# Patient Record
Sex: MALE | Race: WHITE | Employment: UNEMPLOYED | URBAN - METROPOLITAN AREA
[De-identification: names, ages, dates, MRNs, and addresses within clinical notes are randomized per-mention and may not be internally consistent; named-entity substitution may affect disease eponyms.]

---

## 2017-02-08 ENCOUNTER — HOSPITAL ENCOUNTER (OUTPATIENT)
Dept: GENERAL RADIOLOGY | Age: 72
Discharge: HOME | End: 2017-02-08
Attending: FAMILY MEDICINE | Admitting: FAMILY MEDICINE

## 2017-02-08 ENCOUNTER — OFFICE VISIT (OUTPATIENT)
Dept: FAMILY MEDICINE CLINIC | Age: 72
End: 2017-02-08

## 2017-02-08 VITALS
BODY MASS INDEX: 41.44 KG/M2 | WEIGHT: 249 LBS | HEART RATE: 60 BPM | TEMPERATURE: 96.9 F | DIASTOLIC BLOOD PRESSURE: 60 MMHG | SYSTOLIC BLOOD PRESSURE: 116 MMHG | OXYGEN SATURATION: 96 %

## 2017-02-08 DIAGNOSIS — M54.50 MIDLINE LOW BACK PAIN WITHOUT SCIATICA, UNSPECIFIED CHRONICITY: ICD-10-CM

## 2017-02-08 DIAGNOSIS — E03.9 ACQUIRED HYPOTHYROIDISM: ICD-10-CM

## 2017-02-08 DIAGNOSIS — Z91.09 ENVIRONMENTAL ALLERGIES: ICD-10-CM

## 2017-02-08 DIAGNOSIS — Z79.899 PHARMACOLOGIC THERAPY: ICD-10-CM

## 2017-02-08 DIAGNOSIS — N39.46 MIXED INCONTINENCE: ICD-10-CM

## 2017-02-08 DIAGNOSIS — F41.9 ANXIETY: ICD-10-CM

## 2017-02-08 DIAGNOSIS — Z13.9 SCREENING: ICD-10-CM

## 2017-02-08 DIAGNOSIS — I10 ESSENTIAL HYPERTENSION: Primary | ICD-10-CM

## 2017-02-08 LAB
ALBUMIN SERPL-MCNC: 4.3 GM/DL (ref 3.4–5)
ALP BLD-CCNC: 91 IU/L (ref 40–128)
ALT SERPL-CCNC: 15 U/L (ref 10–40)
AMPHETAMINE SCREEN, URINE: NORMAL
ANION GAP SERPL CALCULATED.3IONS-SCNC: 11 MMOL/L (ref 4–16)
AST SERPL-CCNC: 22 IU/L (ref 15–37)
BARBITURATE SCREEN, URINE: NORMAL
BASOPHILS ABSOLUTE: 0 K/CU MM
BASOPHILS RELATIVE PERCENT: 0.4 % (ref 0–1)
BENZODIAZEPINE SCREEN, URINE: NORMAL
BILIRUB SERPL-MCNC: 0.3 MG/DL (ref 0–1)
BUN BLDV-MCNC: 24 MG/DL (ref 6–23)
CALCIUM SERPL-MCNC: 9.7 MG/DL (ref 8.3–10.6)
CHLORIDE BLD-SCNC: 99 MMOL/L (ref 99–110)
CHOLESTEROL: 169 MG/DL
CO2: 30 MMOL/L (ref 21–32)
COCAINE METABOLITE SCREEN URINE: NORMAL
CREAT SERPL-MCNC: 1.2 MG/DL (ref 0.9–1.3)
DIFFERENTIAL TYPE: ABNORMAL
EOSINOPHILS ABSOLUTE: 0.1 K/CU MM
EOSINOPHILS RELATIVE PERCENT: 1.3 % (ref 0–3)
GFR AFRICAN AMERICAN: >60 ML/MIN/1.73M2
GFR NON-AFRICAN AMERICAN: 60 ML/MIN/1.73M2
GLUCOSE FASTING: 94 MG/DL (ref 70–99)
HCT VFR BLD CALC: 42.8 % (ref 42–52)
HDLC SERPL-MCNC: 29 MG/DL
HEMOGLOBIN: 13.1 GM/DL (ref 13.5–18)
IMMATURE NEUTROPHIL %: 0.1 % (ref 0–0.43)
LDL CHOLESTEROL CALCULATED: 112 MG/DL
LYMPHOCYTES ABSOLUTE: 1.9 K/CU MM
LYMPHOCYTES RELATIVE PERCENT: 27.5 % (ref 24–44)
MCH RBC QN AUTO: 30.2 PG (ref 27–31)
MCHC RBC AUTO-ENTMCNC: 30.6 % (ref 32–36)
MCV RBC AUTO: 98.6 FL (ref 78–100)
MDMA URINE: NORMAL
METHADONE SCREEN, URINE: NORMAL
METHAMPHETAMINE, URINE: NORMAL
MONOCYTES ABSOLUTE: 0.6 K/CU MM
MONOCYTES RELATIVE PERCENT: 8.9 % (ref 0–4)
NUCLEATED RBC %: 0 %
OPIATE SCREEN URINE: NORMAL
OXYCODONE SCREEN URINE: NORMAL
PDW BLD-RTO: 12.3 % (ref 11.7–14.9)
PHENCYCLIDINE SCREEN URINE: NORMAL
PLATELET # BLD: 202 K/CU MM (ref 140–440)
PMV BLD AUTO: 9.8 FL (ref 7.5–11.1)
POTASSIUM SERPL-SCNC: 5.3 MMOL/L (ref 3.5–5.1)
PROPOXYPHENE SCREEN, URINE: NORMAL
PROSTATE SPECIFIC ANTIGEN: 0.09 NG/ML (ref 0–4)
RBC # BLD: 4.34 M/CU MM (ref 4.6–6.2)
SEGMENTED NEUTROPHILS ABSOLUTE COUNT: 4.3 K/CU MM
SEGMENTED NEUTROPHILS RELATIVE PERCENT: 61.8 % (ref 36–66)
SODIUM BLD-SCNC: 140 MMOL/L (ref 135–145)
T3 FREE: 2.9 PG/ML (ref 2.3–4.2)
THC: NORMAL
TOTAL IMMATURE NEUTOROPHIL: 0.01 K/CU MM
TOTAL NUCLEATED RBC: 0 K/CU MM
TOTAL PROTEIN: 7.4 GM/DL (ref 6.4–8.2)
TRICYCLIC ANTIDEPRESSANTS, UR: NORMAL
TRIGL SERPL-MCNC: 140 MG/DL
TSH HIGH SENSITIVITY: 0.95 UIU/ML (ref 0.27–4.2)
WBC # BLD: 7 K/CU MM (ref 4–10.5)

## 2017-02-08 PROCEDURE — 80305 DRUG TEST PRSMV DIR OPT OBS: CPT | Performed by: FAMILY MEDICINE

## 2017-02-08 PROCEDURE — 99214 OFFICE O/P EST MOD 30 MIN: CPT | Performed by: FAMILY MEDICINE

## 2017-02-08 RX ORDER — FOLIC ACID 1 MG/1
1 TABLET ORAL DAILY
Qty: 30 TABLET | Refills: 5 | Status: SHIPPED | OUTPATIENT
Start: 2017-02-08

## 2017-02-08 RX ORDER — OLOPATADINE HYDROCHLORIDE 1 MG/ML
1 SOLUTION/ DROPS OPHTHALMIC 2 TIMES DAILY
Qty: 1 BOTTLE | Refills: 5 | Status: SHIPPED | OUTPATIENT
Start: 2017-02-08 | End: 2017-03-10

## 2017-02-08 RX ORDER — CLONAZEPAM 0.5 MG/1
0.5 TABLET ORAL DAILY
Qty: 30 TABLET | Refills: 5 | Status: SHIPPED | OUTPATIENT
Start: 2017-02-08 | End: 2021-05-03

## 2017-02-08 RX ORDER — LEVOCETIRIZINE DIHYDROCHLORIDE 5 MG/1
TABLET, FILM COATED ORAL
Qty: 30 TABLET | Refills: 5 | Status: SHIPPED | OUTPATIENT
Start: 2017-02-08

## 2017-02-08 RX ORDER — TROSPIUM CHLORIDE ER 60 MG/1
60 CAPSULE ORAL DAILY
Qty: 30 CAPSULE | Refills: 5 | Status: SHIPPED | OUTPATIENT
Start: 2017-02-08

## 2017-02-08 RX ORDER — GABAPENTIN 800 MG/1
TABLET ORAL
Qty: 120 TABLET | Refills: 5 | Status: SHIPPED | OUTPATIENT
Start: 2017-02-08 | End: 2018-07-31

## 2017-02-08 RX ORDER — LEVOTHYROXINE SODIUM 0.12 MG/1
250 TABLET ORAL DAILY
Qty: 60 TABLET | Refills: 5 | Status: SHIPPED | OUTPATIENT
Start: 2017-02-08

## 2017-02-08 RX ORDER — POTASSIUM CHLORIDE 750 MG/1
10 TABLET, EXTENDED RELEASE ORAL DAILY
COMMUNITY
End: 2018-07-31 | Stop reason: ALTCHOICE

## 2017-02-08 RX ORDER — RAMIPRIL 5 MG/1
5 CAPSULE ORAL DAILY
Qty: 30 CAPSULE | Refills: 5 | Status: ON HOLD | OUTPATIENT
Start: 2017-02-08 | End: 2021-05-06 | Stop reason: HOSPADM

## 2017-02-08 ASSESSMENT — ENCOUNTER SYMPTOMS
BACK PAIN: 1
RESPIRATORY NEGATIVE: 1
GASTROINTESTINAL NEGATIVE: 1

## 2017-02-09 LAB — T4 TOTAL: 11.13 UG/DL

## 2018-07-31 ENCOUNTER — OFFICE VISIT (OUTPATIENT)
Dept: CARDIOLOGY CLINIC | Age: 73
End: 2018-07-31

## 2018-07-31 VITALS
WEIGHT: 253 LBS | DIASTOLIC BLOOD PRESSURE: 72 MMHG | HEIGHT: 73 IN | SYSTOLIC BLOOD PRESSURE: 128 MMHG | HEART RATE: 70 BPM | BODY MASS INDEX: 33.53 KG/M2

## 2018-07-31 DIAGNOSIS — R06.02 SHORTNESS OF BREATH: Primary | ICD-10-CM

## 2018-07-31 PROCEDURE — 93000 ELECTROCARDIOGRAM COMPLETE: CPT | Performed by: INTERNAL MEDICINE

## 2018-07-31 PROCEDURE — 1101F PT FALLS ASSESS-DOCD LE1/YR: CPT | Performed by: INTERNAL MEDICINE

## 2018-07-31 PROCEDURE — 3017F COLORECTAL CA SCREEN DOC REV: CPT | Performed by: INTERNAL MEDICINE

## 2018-07-31 PROCEDURE — G8427 DOCREV CUR MEDS BY ELIG CLIN: HCPCS | Performed by: INTERNAL MEDICINE

## 2018-07-31 PROCEDURE — G8417 CALC BMI ABV UP PARAM F/U: HCPCS | Performed by: INTERNAL MEDICINE

## 2018-07-31 PROCEDURE — 99204 OFFICE O/P NEW MOD 45 MIN: CPT | Performed by: INTERNAL MEDICINE

## 2018-07-31 NOTE — PROGRESS NOTES
tablet Take 1 tablet by mouth daily 30 tablet 5    ramipril (ALTACE) 5 MG capsule Take 1 capsule by mouth daily 30 capsule 5    clonazePAM (KLONOPIN) 0.5 MG tablet Take 1 tablet by mouth daily 30 tablet 5    trospium (SANCTURA XR) 60 MG CP24 extended release capsule Take 1 capsule by mouth daily 30 capsule 5    levothyroxine (SYNTHROID) 125 MCG tablet Take 2 tablets by mouth daily 60 tablet 5    levocetirizine (XYZAL) 5 MG tablet TAKE ONE TABLET BY MOUTH DAILY 30 tablet 5    clotrimazole-betamethasone (LOTRISONE) cream Apply  topically every 72 hours. Apply every 3 days with dressing changes.  oxyCODONE-acetaminophen (PERCOCET) 5-325 MG per tablet Take 1 tablet by mouth every 4 hours as needed. No current facility-administered medications for this visit.           Review of Systems:    · Constitutional: No Fever or Weight Loss    · Eyes: No Decreased Vision  · ENT: No Headaches, Hearing Loss or Vertigo  · Cardiovascular: + chest pain, dyspnea on exertion, palpitations or loss of consciousness  · Respiratory: No cough or wheezing    · Gastrointestinal: No abdominal pain, appetite loss, blood in stools, constipation, diarrhea or heartburn  · Genitourinary: No dysuria, trouble voiding, or hematuria  · Musculoskeletal: No gait disturbance, weakness or joint complaints  · Integumentary: No rash or pruritis  · Neurological: No TIA or stroke symptoms  · Psychiatric: No anxiety or depression  · Endocrine: No malaise, fatigue or temperature intolerance  · Hematologic/Lymphatic: No bleeding problems, blood clots or swollen lymph nodes  · Allergic/Immunologic: No nasal congestion or hives  All systems negative except as marked.      ·    ·    ·      Physical Examination:    Vitals:    07/31/18 1646   BP: 128/72   Pulse: 70    rr 14  afebrile  Wt Readings from Last 3 Encounters:   07/31/18 253 lb (114.8 kg)   09/06/17 250 lb (113.4 kg)   02/08/17 249 lb (112.9 kg)     Body mass index is 33.38 kg/m².    General Appearance: No distress, conversant     Constitutional: Well developed, Well nourished, No acute distress, Non-toxic appearance.    HENT:  Normocephalic, Atraumatic, Bilateral external ears normal, Oropharynx moist, No oral exudates, Nose normal. Neck- Normal range of motion, No tenderness, Supple, No stridor,no apical-carotid delay, no carotid bruit  Eyes: PERRL, EOMI, Conjunctiva normal, No discharge.    Respiratory:  Normal breath sounds, No respiratory distress, No wheezing, No chest tenderness. ,no use of accessory muscles, diaphragm movement is normal  Cardiovascular: (PMI) apex non displaced,no lifts no thrills, no s3,no s4, Normal heart rate, Normal rhythm, No murmurs, No rubs, No gallops. Carotid arteries pulse and amplitude are normal no bruit, no abdominal bruit noted ( normal abdominal aorta ausculation), femoral arteries pulse and amplitude are normal no bruit, pedal pulses are normal.  GI: Bowel sounds normal, Soft, No tenderness, No masses, No pulsatile masses, no hepatosplenomegally, no bruits  : External genitalia appear normal, No masses or lesions. No discharge. No CVA tenderness.    Musculoskeletal: Intact distal pulses, No edema, No tenderness, No cyanosis, No clubbing. Good range of motion in all major joints. No tenderness to palpation or major deformities noted. Back- No tenderness. Integument: Warm, Dry, No erythema, No rash.    Skin: no rash, no ulcers  Lymphatic: No lymphadenopathy noted.    Neurologic: Alert & oriented x 3, Normal motor function, Normal sensory function, No focal deficits noted.    Psychiatric: Affect normal, Judgment normal, Mood normal.   Lab Review        Recent Labs       09/28/16 0010    WBC  12.3*    HGB  14.4    HCT  43.8    PLT  316            Recent Labs       09/28/16 0010    NA  136    K  3.9    CL  95*    CO2  23    BUN  10    CREATININE  0.8           Recent Labs       09/28/16 0010    AST  12*    ALT  10    BILITOT  0.4    ALKPHOS  124

## 2018-08-06 ENCOUNTER — PROCEDURE VISIT (OUTPATIENT)
Dept: CARDIOLOGY CLINIC | Age: 73
End: 2018-08-06

## 2018-08-06 DIAGNOSIS — R06.02 SHORTNESS OF BREATH: ICD-10-CM

## 2018-08-06 LAB
LV EF: 49 %
LVEF MODALITY: NORMAL

## 2018-08-06 PROCEDURE — A9500 TC99M SESTAMIBI: HCPCS | Performed by: INTERNAL MEDICINE

## 2018-08-06 PROCEDURE — 78452 HT MUSCLE IMAGE SPECT MULT: CPT | Performed by: INTERNAL MEDICINE

## 2018-08-06 PROCEDURE — 93015 CV STRESS TEST SUPVJ I&R: CPT | Performed by: INTERNAL MEDICINE

## 2018-08-08 ENCOUNTER — TELEPHONE (OUTPATIENT)
Dept: CARDIOLOGY CLINIC | Age: 73
End: 2018-08-08

## 2018-08-15 ENCOUNTER — OFFICE VISIT (OUTPATIENT)
Dept: CARDIOLOGY CLINIC | Age: 73
End: 2018-08-15

## 2018-08-15 ENCOUNTER — PROCEDURE VISIT (OUTPATIENT)
Dept: CARDIOLOGY CLINIC | Age: 73
End: 2018-08-15

## 2018-08-15 DIAGNOSIS — M79.89 LEG SWELLING: Primary | ICD-10-CM

## 2018-08-15 DIAGNOSIS — R06.02 SHORTNESS OF BREATH: ICD-10-CM

## 2018-08-15 DIAGNOSIS — R06.02 SHORTNESS OF BREATH: Primary | ICD-10-CM

## 2018-08-15 LAB
LV EF: 58 %
LVEF MODALITY: NORMAL

## 2018-08-15 PROCEDURE — 93970 EXTREMITY STUDY: CPT | Performed by: INTERNAL MEDICINE

## 2018-08-15 PROCEDURE — 93306 TTE W/DOPPLER COMPLETE: CPT | Performed by: INTERNAL MEDICINE

## 2018-08-17 ENCOUNTER — TELEPHONE (OUTPATIENT)
Dept: CARDIOLOGY CLINIC | Age: 73
End: 2018-08-17

## 2018-08-21 ENCOUNTER — OFFICE VISIT (OUTPATIENT)
Dept: CARDIOLOGY CLINIC | Age: 73
End: 2018-08-21

## 2018-08-21 VITALS
WEIGHT: 253.2 LBS | DIASTOLIC BLOOD PRESSURE: 74 MMHG | HEIGHT: 73 IN | HEART RATE: 60 BPM | SYSTOLIC BLOOD PRESSURE: 144 MMHG | BODY MASS INDEX: 33.56 KG/M2

## 2018-08-21 DIAGNOSIS — M79.89 LEG SWELLING: Primary | ICD-10-CM

## 2018-08-21 PROCEDURE — 1101F PT FALLS ASSESS-DOCD LE1/YR: CPT | Performed by: INTERNAL MEDICINE

## 2018-08-21 PROCEDURE — 99214 OFFICE O/P EST MOD 30 MIN: CPT | Performed by: INTERNAL MEDICINE

## 2018-08-21 PROCEDURE — 1123F ACP DISCUSS/DSCN MKR DOCD: CPT | Performed by: INTERNAL MEDICINE

## 2018-08-21 PROCEDURE — 3017F COLORECTAL CA SCREEN DOC REV: CPT | Performed by: INTERNAL MEDICINE

## 2018-08-21 PROCEDURE — G8417 CALC BMI ABV UP PARAM F/U: HCPCS | Performed by: INTERNAL MEDICINE

## 2018-08-21 PROCEDURE — 4040F PNEUMOC VAC/ADMIN/RCVD: CPT | Performed by: INTERNAL MEDICINE

## 2018-08-21 PROCEDURE — G8427 DOCREV CUR MEDS BY ELIG CLIN: HCPCS | Performed by: INTERNAL MEDICINE

## 2018-08-21 PROCEDURE — 1036F TOBACCO NON-USER: CPT | Performed by: INTERNAL MEDICINE

## 2018-08-21 NOTE — PROGRESS NOTES
Allergies: Baclofen and Morphine  Past Medical History:   Diagnosis Date    Arthritis     Blood circulation, collateral     Chronic venous hypertension with ulcer ([address]     Edema     Essential hypertension 4/19/2016    H/O cardiovascular stress test 08/06/2018    NM Normal study    History of nuclear stress test 08/06/2018    Normal LV function    Hx of Venous Doppler ultrasound 08/15/2018    No evidence of DVT or SVT No significant reflux noted in the veins of the right lower extremity.  Significant reflux noted in the Left CFV and mid FV,    Hyperlipidemia     Lumbago     Neuromuscular disorder (HCC)     Neuropathy     Orthostatic hypotension     Osteoarthritis     [facility]VD [facility]per[facility] vascular disease) ([facility]     Thyroid disease     Unstageable pressure ulcer of buttock ([address]      Past Surgical History:   Procedure Laterality Date    ADRENALECTOMY  1-6-1999    EYE SURGERY  9-    cataract removal right eye    EYE SURGERY  12-1-1997    cataract removal left eye    JOINT REPLACEMENT  1-    complete right hip     LAMINOTOMY  4-6-2005    microdiskectomy L3-L4    TOTAL HIP ARTHROPLASTY Left 2015     Family History   Problem Relation Age of Onset    Heart Disease Father     High Blood Pressure Father     Early Death Brother     Cancer Brother      Social History   Substance Use Topics    Smoking status: Never Smoker    Smokeless tobacco: Never Used    Alcohol use No      [unfilled]  Review of Systems:   · Constitutional: No Fever or Weight Loss   · Eyes: No Decreased Vision  · ENT: No Headaches, Hearing Loss or Vertigo  · Cardiovascular: No chest pain, dyspnea on exertion, palpitations or loss of consciousness  · Respiratory: No cough or wheezing    · Gastrointestinal: No abdominal pain, appetite loss, blood in stools, constipation, diarrhea or heartburn  · Genitourinary: No dysuria, trouble voiding, or hematuria  · Musculoskeletal:  No gait disturbance, weakness or joint complaints  · Integumentary: No rash or pruritis  · Neurological: No TIA or stroke symptoms  · Psychiatric: No anxiety or depression  · Endocrine: No malaise, fatigue or temperature intolerance  · Hematologic/Lymphatic: No bleeding problems, blood clots or swollen lymph nodes  · Allergic/Immunologic: No nasal congestion or hives  All systems negative except as marked. Objective:  BP (!) 144/74 (Site: Left Arm, Position: Sitting, Cuff Size: Large Adult)   Pulse 60   Ht 6' 1\" (1.854 m)   Wt 253 lb 3.2 oz (114.9 kg)   BMI 33.41 kg/m²   Wt Readings from Last 3 Encounters:   08/21/18 253 lb 3.2 oz (114.9 kg)   07/31/18 253 lb (114.8 kg)   09/06/17 250 lb (113.4 kg)     Body mass index is 33.41 kg/m². GENERAL - Alert, oriented, pleasant, in no apparent distress,normal grooming  HEENT  pupils are reactive to light and accomodation, cornea intact, conjunctive normal color, ears are normal in exam,throat exam in normal, teeth, gum and palate are normal, oral mucosa is normal without any notation of pallor or cyanosis  Neck - Supple. No jugular venous distention noted. No carotid bruits, no apical -carotid delay  Respiratory:  Normal breath sounds, No respiratory distress, No wheezing, No chest tenderness. ,no use of accessory muscles, diaphragm movement is normal  Cardiovascular: (PMI) apex non displaced,no lifts no thrills, no s3,no s4, Normal heart rate, Normal rhythm, No murmurs, No rubs, No gallops.  Carotid arteries pulse and amplitude are normal no bruit, no abdominal bruit noted ( normal abdominal aorta ausculation), femoral arteries pulse and amplitude are normal no bruit, pedal pulses are normal  Femoral pulses have normal amplitude, no bruits   Extremities - No cyanosis, clubbing, or significant edema, no varicose veins    Abdomen  No masses, tenderness, or organomegaly, no hepato-splenomegally, no bruits  Musculoskeletal  No significant edema, no kyphosis or scoliosis, no deformity in any extremity noted, muscle strength and tone are normal  Skin: no ulcer,no scar,no stasis dermatitis   Neurologic  alert oriented times 3,Cranial nerves II through XII are grossly intact. There were no gross focal neurologic abnormalities. All sensory and motor nerves examined and were normal  Psychiatric: normal mood and affect    No results found for: CKTOTAL, CKMB, CKMBINDEX, TROPONINI  BNP:  No results found for: BNP  PT/INR:  No results found for: PTINR  Lab Results   Component Value Date    LABA1C 5.3 09/04/2015     Lab Results   Component Value Date    CHOL 169 02/08/2017    TRIG 140 02/08/2017    HDL 29 (L) 02/08/2017    LDLCALC 112 (H) 02/08/2017    LDLDIRECT 117 (H) 10/17/2016     Lab Results   Component Value Date    ALT 15 02/08/2017    AST 22 02/08/2017     TSH:  No results found for: TSH    Impression:  Nabor Cordero is a 68 y. o.year old who  has a past medical history of Arthritis; Blood circulation, collateral; Chronic venous hypertension with ulcer ([address] Edema; Essential hypertension; H/O cardiovascular stress test; History of nuclear stress test; Hx of Venous Doppler ultrasound; Hyperlipidemia; Lumbago; Neuromuscular disorder ([address] Neuropathy; Orthostatic hypotension; Osteoarthritis; PVD (peripheral vascular disease) ([address] Thyroid disease; and Unstageable pressure ulcer of buttock ([address] and presents with     Plan:  1. Chest pain: normal patient stress test and echo  2. Leg swelling and stasis dermatitis; venous doppler showed left SFV venous insufficiency, will recommend to use compressions   3. HTN: stable, continue altace  4. Hypothyroidism: continue synthroid  5. Exhaustion; r/o hypothyroidism. 6. Dyslipidemia: stable  7. All labs, medications and tests reviewed, continue all other medications of all above medical condition listed as is.

## 2018-11-23 ENCOUNTER — ANESTHESIA EVENT (OUTPATIENT)
Dept: ENDOSCOPY | Age: 73
End: 2018-11-23
Payer: MEDICARE

## 2018-11-23 NOTE — ANESTHESIA PRE PROCEDURE
POC Tests: No results for input(s): POCGLU, POCNA, POCK, POCCL, POCBUN, POCHEMO, POCHCT in the last 72 hours. Coags:   Lab Results   Component Value Date    PROTIME 11.6 02/06/2012    INR 1.06 02/06/2012       HCG (If Applicable): No results found for: PREGTESTUR, PREGSERUM, HCG, HCGQUANT     ABGs: No results found for: PHART, PO2ART, HND9OCW, JPA0FRW, BEART, U3QOPWBA     Type & Screen (If Applicable):  No results found for: [facility]    Anesthesia Evaluation  Patient summary reviewed   history of anesthetic complications ( Known difficult airway - note from anesthesia at [facility]): difficult airway. Airway: Mallampati: II  TM distance: <3 FB   Neck ROM: limited  Comment: No ROM in c spine - bone spur and multiple spinal fusions  Mouth opening: > = 3 FB Dental:    (+) upper dentures and partials      Pulmonary: breath sounds clear to auscultation                             Cardiovascular:  Exercise tolerance: good (>4 METS),   (+) hypertension: moderate, hyperlipidemia      ECG reviewed  Rhythm: regular  Rate: normal  Echocardiogram reviewed         Beta Blocker:  Not on Beta Blocker      ROS comment: 2018 echo   Summary   Left ventricular systolic function is normal with an ejection fraction of   55-60%.  Grade I diastolic dysfunction.   Mild concentric left ventricular hypertrophy.   No significant valvular disease noted.   No evidence of pericardial effusion. Neuro/Psych:   (+) neuromuscular disease:,             GI/Hepatic/Renal:             Endo/Other:    (+) hypothyroidism::., .                  ROS comment: BPH  Chronic back pain - bone spur in c spine Abdominal:           Vascular:   + PVD, aortic or cerebral, . Anesthesia Plan      MAC and TIVA     ASA 3       Induction: intravenous. Anesthetic plan and risks discussed with patient. Plan discussed with CRNA.                 MIGUEL Sanchez - CRNA   11/23/2018

## 2018-11-26 RX ORDER — DULOXETIN HYDROCHLORIDE 30 MG/1
30 CAPSULE, DELAYED RELEASE ORAL DAILY
COMMUNITY
End: 2019-06-17

## 2018-11-26 RX ORDER — DONEPEZIL HYDROCHLORIDE 10 MG/1
10 TABLET, FILM COATED ORAL NIGHTLY
Status: ON HOLD | COMMUNITY
End: 2021-05-06 | Stop reason: HOSPADM

## 2018-11-26 RX ORDER — SUCRALFATE ORAL 1 G/10ML
1 SUSPENSION ORAL 2 TIMES DAILY
COMMUNITY
End: 2019-06-17

## 2018-11-26 RX ORDER — PANTOPRAZOLE SODIUM 40 MG/1
40 TABLET, DELAYED RELEASE ORAL DAILY
COMMUNITY
End: 2019-06-17

## 2018-11-27 ENCOUNTER — ANESTHESIA (OUTPATIENT)
Dept: ENDOSCOPY | Age: 73
End: 2018-11-27
Payer: MEDICARE

## 2018-11-27 ENCOUNTER — HOSPITAL ENCOUNTER (OUTPATIENT)
Age: 73
Setting detail: OUTPATIENT SURGERY
Discharge: HOME | End: 2018-11-27
Attending: INTERNAL MEDICINE | Admitting: INTERNAL MEDICINE
Payer: MEDICARE

## 2018-11-27 VITALS
SYSTOLIC BLOOD PRESSURE: 141 MMHG | OXYGEN SATURATION: 95 % | TEMPERATURE: 97.9 F | HEIGHT: 69 IN | DIASTOLIC BLOOD PRESSURE: 68 MMHG | WEIGHT: 249 LBS | RESPIRATION RATE: 16 BRPM | HEART RATE: 62 BPM | BODY MASS INDEX: 36.88 KG/M2

## 2018-11-27 VITALS — DIASTOLIC BLOOD PRESSURE: 67 MMHG | SYSTOLIC BLOOD PRESSURE: 119 MMHG | OXYGEN SATURATION: 100 %

## 2018-11-27 PROCEDURE — 7100000010 HC PHASE II RECOVERY - FIRST 15 MIN: Performed by: INTERNAL MEDICINE

## 2018-11-27 PROCEDURE — 88305 TISSUE EXAM BY PATHOLOGIST: CPT

## 2018-11-27 PROCEDURE — 2709999900 HC NON-CHARGEABLE SUPPLY: Performed by: INTERNAL MEDICINE

## 2018-11-27 PROCEDURE — 88342 IMHCHEM/IMCYTCHM 1ST ANTB: CPT

## 2018-11-27 PROCEDURE — 7100000011 HC PHASE II RECOVERY - ADDTL 15 MIN: Performed by: INTERNAL MEDICINE

## 2018-11-27 PROCEDURE — 3700000001 HC ADD 15 MINUTES (ANESTHESIA): Performed by: INTERNAL MEDICINE

## 2018-11-27 PROCEDURE — 2580000003 HC RX 258: Performed by: NURSE ANESTHETIST, CERTIFIED REGISTERED

## 2018-11-27 PROCEDURE — 3609010600 HC COLONOSCOPY POLYPECTOMY SNARE/COLD BIOPSY: Performed by: INTERNAL MEDICINE

## 2018-11-27 PROCEDURE — 2580000003 HC RX 258

## 2018-11-27 PROCEDURE — 3700000000 HC ANESTHESIA ATTENDED CARE: Performed by: INTERNAL MEDICINE

## 2018-11-27 PROCEDURE — 2500000003 HC RX 250 WO HCPCS: Performed by: NURSE ANESTHETIST, CERTIFIED REGISTERED

## 2018-11-27 PROCEDURE — 6360000002 HC RX W HCPCS: Performed by: NURSE ANESTHETIST, CERTIFIED REGISTERED

## 2018-11-27 PROCEDURE — 2500000003 HC RX 250 WO HCPCS: Performed by: INTERNAL MEDICINE

## 2018-11-27 PROCEDURE — 3609012400 HC EGD TRANSORAL BIOPSY SINGLE/MULTIPLE: Performed by: INTERNAL MEDICINE

## 2018-11-27 RX ORDER — SODIUM CHLORIDE, SODIUM LACTATE, POTASSIUM CHLORIDE, CALCIUM CHLORIDE 600; 310; 30; 20 MG/100ML; MG/100ML; MG/100ML; MG/100ML
INJECTION, SOLUTION INTRAVENOUS
Status: COMPLETED
Start: 2018-11-27 | End: 2018-11-27

## 2018-11-27 RX ORDER — LIDOCAINE HYDROCHLORIDE 20 MG/ML
INJECTION, SOLUTION INFILTRATION; PERINEURAL PRN
Status: DISCONTINUED | OUTPATIENT
Start: 2018-11-27 | End: 2018-11-27 | Stop reason: SDUPTHER

## 2018-11-27 RX ORDER — KETAMINE HYDROCHLORIDE 50 MG/ML
50 INJECTION, SOLUTION, CONCENTRATE INTRAMUSCULAR; INTRAVENOUS ONCE
Status: COMPLETED | OUTPATIENT
Start: 2018-11-27 | End: 2018-11-27

## 2018-11-27 RX ORDER — SODIUM CHLORIDE, SODIUM LACTATE, POTASSIUM CHLORIDE, CALCIUM CHLORIDE 600; 310; 30; 20 MG/100ML; MG/100ML; MG/100ML; MG/100ML
INJECTION, SOLUTION INTRAVENOUS CONTINUOUS PRN
Status: DISCONTINUED | OUTPATIENT
Start: 2018-11-27 | End: 2018-11-27 | Stop reason: SDUPTHER

## 2018-11-27 RX ORDER — PROPOFOL 10 MG/ML
INJECTION, EMULSION INTRAVENOUS PRN
Status: DISCONTINUED | OUTPATIENT
Start: 2018-11-27 | End: 2018-11-27 | Stop reason: SDUPTHER

## 2018-11-27 RX ORDER — SODIUM CHLORIDE, SODIUM LACTATE, POTASSIUM CHLORIDE, CALCIUM CHLORIDE 600; 310; 30; 20 MG/100ML; MG/100ML; MG/100ML; MG/100ML
INJECTION, SOLUTION INTRAVENOUS ONCE
Status: COMPLETED | OUTPATIENT
Start: 2018-11-27 | End: 2018-11-27

## 2018-11-27 RX ADMIN — PROPOFOL 20 MG: 10 INJECTION, EMULSION INTRAVENOUS at 11:03

## 2018-11-27 RX ADMIN — LIDOCAINE HYDROCHLORIDE 100 MG: 20 INJECTION, SOLUTION INFILTRATION; PERINEURAL at 10:38

## 2018-11-27 RX ADMIN — PROPOFOL 20 MG: 10 INJECTION, EMULSION INTRAVENOUS at 10:51

## 2018-11-27 RX ADMIN — PROPOFOL 20 MG: 10 INJECTION, EMULSION INTRAVENOUS at 11:36

## 2018-11-27 RX ADMIN — PROPOFOL 20 MG: 10 INJECTION, EMULSION INTRAVENOUS at 10:45

## 2018-11-27 RX ADMIN — PROPOFOL 20 MG: 10 INJECTION, EMULSION INTRAVENOUS at 10:56

## 2018-11-27 RX ADMIN — PROPOFOL 20 MG: 10 INJECTION, EMULSION INTRAVENOUS at 11:14

## 2018-11-27 RX ADMIN — Medication 15 MG: at 10:55

## 2018-11-27 RX ADMIN — PROPOFOL 20 MG: 10 INJECTION, EMULSION INTRAVENOUS at 10:39

## 2018-11-27 RX ADMIN — PROPOFOL 20 MG: 10 INJECTION, EMULSION INTRAVENOUS at 10:49

## 2018-11-27 RX ADMIN — PROPOFOL 20 MG: 10 INJECTION, EMULSION INTRAVENOUS at 10:41

## 2018-11-27 RX ADMIN — PROPOFOL 60 MG: 10 INJECTION, EMULSION INTRAVENOUS at 10:38

## 2018-11-27 RX ADMIN — SODIUM CHLORIDE, POTASSIUM CHLORIDE, SODIUM LACTATE AND CALCIUM CHLORIDE: 600; 310; 30; 20 INJECTION, SOLUTION INTRAVENOUS at 11:15

## 2018-11-27 RX ADMIN — PROPOFOL 20 MG: 10 INJECTION, EMULSION INTRAVENOUS at 10:43

## 2018-11-27 RX ADMIN — PROPOFOL 20 MG: 10 INJECTION, EMULSION INTRAVENOUS at 11:25

## 2018-11-27 RX ADMIN — Medication 10 MG: at 11:22

## 2018-11-27 RX ADMIN — PROPOFOL 20 MG: 10 INJECTION, EMULSION INTRAVENOUS at 11:33

## 2018-11-27 RX ADMIN — PROPOFOL 20 MG: 10 INJECTION, EMULSION INTRAVENOUS at 11:07

## 2018-11-27 RX ADMIN — PROPOFOL 20 MG: 10 INJECTION, EMULSION INTRAVENOUS at 11:30

## 2018-11-27 RX ADMIN — PROPOFOL 20 MG: 10 INJECTION, EMULSION INTRAVENOUS at 11:21

## 2018-11-27 RX ADMIN — PROPOFOL 20 MG: 10 INJECTION, EMULSION INTRAVENOUS at 11:28

## 2018-11-27 RX ADMIN — SODIUM CHLORIDE, POTASSIUM CHLORIDE, SODIUM LACTATE AND CALCIUM CHLORIDE: 600; 310; 30; 20 INJECTION, SOLUTION INTRAVENOUS at 10:15

## 2018-11-27 RX ADMIN — PROPOFOL 20 MG: 10 INJECTION, EMULSION INTRAVENOUS at 11:32

## 2018-11-27 RX ADMIN — PROPOFOL 20 MG: 10 INJECTION, EMULSION INTRAVENOUS at 10:47

## 2018-11-27 RX ADMIN — PROPOFOL 20 MG: 10 INJECTION, EMULSION INTRAVENOUS at 11:10

## 2018-11-27 RX ADMIN — SODIUM CHLORIDE, POTASSIUM CHLORIDE, SODIUM LACTATE AND CALCIUM CHLORIDE: 600; 310; 30; 20 INJECTION, SOLUTION INTRAVENOUS at 08:41

## 2018-11-27 RX ADMIN — PROPOFOL 20 MG: 10 INJECTION, EMULSION INTRAVENOUS at 11:18

## 2018-11-27 RX ADMIN — PROPOFOL 20 MG: 10 INJECTION, EMULSION INTRAVENOUS at 10:53

## 2018-11-27 RX ADMIN — Medication 10 MG: at 11:00

## 2018-11-27 RX ADMIN — PROPOFOL 20 MG: 10 INJECTION, EMULSION INTRAVENOUS at 10:59

## 2018-11-27 RX ADMIN — SODIUM CHLORIDE, SODIUM LACTATE, POTASSIUM CHLORIDE, CALCIUM CHLORIDE: 600; 310; 30; 20 INJECTION, SOLUTION INTRAVENOUS at 08:41

## 2018-11-27 ASSESSMENT — PAIN SCALES - GENERAL
PAINLEVEL_OUTOF10: 0

## 2018-11-27 ASSESSMENT — PAIN - FUNCTIONAL ASSESSMENT: PAIN_FUNCTIONAL_ASSESSMENT: 0-10

## 2018-11-27 NOTE — OP NOTE
Full note in [facility]    Indication:  Nausea, weight loss, GERD    Procedure:  EGD with biopsy    Findings:  Erythematous gastric mucosa, biopsied to evaluate for H. Pylori, portal hypertensive gastropathy  Otherwise normal exam    EBL: minimal      Complications: none      Plan:  F/u path  RTC 2 weeks

## 2018-11-27 NOTE — H&P
Pulse 70   Temp 97 °F (36.1 °C) (Temporal)   Resp 18   Ht 5' 9\" (1.753 m)   Wt 249 lb (112.9 kg)   SpO2 95%   BMI 36.77 kg/m²     General Appearance:    Alert, cooperative, no distress, appears stated age   HEENT:    Normocephalic, atraumatic, anicteric   Neck:   Supple, symmetrical, trachea midline, no adenopathy;      Lungs:     Clear to auscultation bilaterally, respirations unlabored        Heart:    Regular rate and rhythm, S1 and S2 normal, no murmur, rub   or gallop   Abdomen:     Soft, non-tender, bowel sounds active all four quadrants,     no masses, no organomegaly, no ascitis   Rectal:    Planned at time of C scope   Extremities:   Extremities normal, atraumatic, no cyanosis or edema   Pulses:   2+ and symmetric all extremities   Skin:   Skin color, texture, turgor normal, no rashes or lesions       Neurologic:   No focal deficits, moving all four extremities      ASA Grade:  ASA 3 - Patient with moderate systemic disease with functional limitations  Air Way:    Prior Anesthesia complication: NILL    ASSESSMENT AND PLAN:    1. Patient is a 68 y.o. male here for colonoscopy and EGD with deep sedation  2. Procedure options, risks and benefits reviewed with patient. Patient expresses understanding.     [employer]  11/27/2018  10:23 AM

## 2019-06-12 ENCOUNTER — TELEPHONE (OUTPATIENT)
Dept: CARDIOLOGY CLINIC | Age: 74
End: 2019-06-12

## 2019-06-12 NOTE — LETTER
[facility]  [address], [place], [address]  [facility], [address]  Phone: [phone]    Fax [phone]                  Dick Chilel MD, Halina Griffin MD, Raj Pereira MD, MD Brunilda Morgan, MD [place], APRN-CNP   Levonne Lung, APRN-CNP  Lendon Fears, APRN-CNP    Cardiac Risk Assessment                                                                                           6/20/2019       Surgery Date: 6/20/19  Surgery: Replacement of device for intrathecal drug infusion into L lower abd  Anesthesia Type: General  Fax Number: [phone]    To: Dr. Samara Victor  From : Dr. General Arellano    Patient Name: Lemuel Monaco     YOB: 1945    The patient was evaluated from a cardiac standpoint for his surgery or procedure and based on his history and test results, he is considered a medium risk candidate for any jossue-operative cardiac complications. Please continue patient's current medical regimen; do not discontinue beta blockers. Antiplatelet therapy can be held prior to the surgery or procedure at the discretion of the surgeon to be resumed as soon as possible if held. Please call with any further questions.     Respectfully,          Gary Esposito MD, MA/ramiros

## 2019-06-12 NOTE — TELEPHONE ENCOUNTER
Cardiac clearance request received from Dr. Claudy Fry for Replacement of defice for intrathecal drug infusion into L lower Abd scheduled  6/20/19. FAX # [phone]. Patient has not been seen since August 2018.   He was notified and made an appointment for 5/17/19 @ 4:20 with Dr. Fabiola Henley

## 2019-06-17 ENCOUNTER — OFFICE VISIT (OUTPATIENT)
Dept: CARDIOLOGY CLINIC | Age: 74
End: 2019-06-17
Payer: MEDICARE

## 2019-06-17 VITALS
DIASTOLIC BLOOD PRESSURE: 70 MMHG | HEART RATE: 60 BPM | WEIGHT: 246.2 LBS | HEIGHT: 72 IN | SYSTOLIC BLOOD PRESSURE: 110 MMHG | BODY MASS INDEX: 33.35 KG/M2

## 2019-06-17 DIAGNOSIS — Z01.810 PREOP CARDIOVASCULAR EXAM: Primary | ICD-10-CM

## 2019-06-17 PROCEDURE — 1036F TOBACCO NON-USER: CPT | Performed by: INTERNAL MEDICINE

## 2019-06-17 PROCEDURE — 99214 OFFICE O/P EST MOD 30 MIN: CPT | Performed by: INTERNAL MEDICINE

## 2019-06-17 PROCEDURE — 1123F ACP DISCUSS/DSCN MKR DOCD: CPT | Performed by: INTERNAL MEDICINE

## 2019-06-17 PROCEDURE — G8427 DOCREV CUR MEDS BY ELIG CLIN: HCPCS | Performed by: INTERNAL MEDICINE

## 2019-06-17 PROCEDURE — 3017F COLORECTAL CA SCREEN DOC REV: CPT | Performed by: INTERNAL MEDICINE

## 2019-06-17 PROCEDURE — 4040F PNEUMOC VAC/ADMIN/RCVD: CPT | Performed by: INTERNAL MEDICINE

## 2019-06-17 PROCEDURE — G8417 CALC BMI ABV UP PARAM F/U: HCPCS | Performed by: INTERNAL MEDICINE

## 2019-06-17 NOTE — PROGRESS NOTES
Syd Alvarez MD        OFFICE  FOLLOWUP NOTE    Chief complaints: patient is here for management of leg swelling, chest pain, HTN, dyslipidemia, hypothyroidism, PREOP    Reason for visit: preop for PAIN PUMP REPLACEMENT    Subjective: patient feels better, no chest pain, no shortness of breath, no dizziness, no palpitations    HPI Lashell Simmons is a 68 y. o.year old who  has a past medical history of Arthritis, Blood circulation, collateral, Chronic back pain, Chronic venous hypertension with ulcer ([address] Difficult intubation, Edema, Enlarged prostate, Essential hypertension, H/O cardiovascular stress test, History of nuclear stress test, Hx of Venous Doppler ultrasound, HX OTHER MEDICAL, Hyperlipidemia, Lumbago, Neuromuscular disorder ([address] Neuropathy, Orthostatic hypotension, Osteoarthritis, PVD (peripheral vascular disease) ([address] Short-term memory loss, Thyroid disease, and Unstageable pressure ulcer of buttock ([address] and presents for management of management of leg swelling, chest pain, HTN, dyslipidemia, hypothyroidism which are well controlled, he will need PAIN PUMP REPLACEMENT      Current Outpatient Medications   Medication Sig Dispense Refill    donepezil (ARICEPT) 10 MG tablet Take 10 mg by mouth nightly      oxaprozin (DAYPRO) 600 MG tablet Take 1 tablet by mouth daily 30 tablet 5    folic acid (FOLVITE) 1 MG tablet Take 1 tablet by mouth daily 30 tablet 5    ramipril (ALTACE) 5 MG capsule Take 1 capsule by mouth daily 30 capsule 5    clonazePAM (KLONOPIN) 0.5 MG tablet Take 1 tablet by mouth daily (Patient taking differently: Take 0.5 mg by mouth daily as needed. Celena aKufman ) 30 tablet 5    trospium (SANCTURA XR) 60 MG CP24 extended release capsule Take 1 capsule by mouth daily 30 capsule 5    levothyroxine (SYNTHROID) 125 MCG tablet Take 2 tablets by mouth daily 60 tablet 5    levocetirizine (XYZAL) 5 MG tablet TAKE ONE TABLET BY MOUTH DAILY 30 tablet 5    oxyCODONE-acetaminophen (PERCOCET) 5-325 MG per tablet Take 1 tablet by mouth every 4 hours as needed.  Compression Stockings MISC by Does not apply route 20 - 30 mmh Wear Daily Can Take Off At Night  Thigh High 1 each 1    clotrimazole-betamethasone (LOTRISONE) cream Apply  topically every 72 hours. Apply every 3 days with dressing changes. No current facility-administered medications for this visit. Allergies: Patient has no known allergies. Past Medical History:   Diagnosis Date    Arthritis     Blood circulation, collateral     Chronic back pain     Chronic venous hypertension with ulcer ([address]     Difficult intubation     Edema     Enlarged prostate     Essential hypertension 4/19/2016    H/O cardiovascular stress test 08/06/2018    NM Normal study    History of nuclear stress test 08/06/2018    Normal LV function    Hx of Venous Doppler ultrasound 08/15/2018    No evidence of DVT or SVT No significant reflux noted in the veins of the right lower extremity.  Significant reflux noted in the Left CFV and mid FV,    HX OTHER MEDICAL     \"have trouble swallowing sometimes and they have had trouble putting the tube down my throat in the past- have a letter will bring in copy for anesthesia    Hyperlipidemia     Lumbago     Neuromuscular disorder ([address]     Neuropathy     Orthostatic hypotension     Osteoarthritis     PVD (peripheral vascular disease) ([address]     Short-term memory loss     on medication    Thyroid disease     Unstageable pressure ulcer of buttock ([address]      Past Surgical History:   Procedure Laterality Date    ADRENALECTOMY  1-6-1999    BACK SURGERY      \"had back surgery total of 6 times over past 25 yrs- last one 2005- first one was [address]      \"had colonoscopy 40 yrs ago\"    COLONOSCOPY N/A 11/27/2018    COLONOSCOPY POLYPECTOMY SNARE/COLD BIOPSY performed by Benjie Soto MD at [facility].  9-    cataract removal right eye    EYE SURGERY  12-1-1997    cataract removal left eye    JOINT REPLACEMENT  1-    complete right hip     LAMINOTOMY  4-6-2005    microdiskectomy L3-L4    OTHER SURGICAL HISTORY      \"have infusion pump ( pain pump) put in 2008 and second one done 2015\"    TONSILLECTOMY  age 29    TOTAL HIP ARTHROPLASTY Left 2015    UPPER GASTROINTESTINAL ENDOSCOPY N/A 11/27/2018    EGD BIOPSY performed by Felton Champion MD at [facility] ENDOSCOPY     Family History   Problem Relation Age of Onset    Heart Disease Mother     High Blood Pressure Mother     Heart Disease Father     High Blood Pressure Father     Early Death Brother     Cancer Brother      Social History     Tobacco Use    Smoking status: Never Smoker    Smokeless tobacco: Never Used   Substance Use Topics    Alcohol use: No      [unfilled]  Review of Systems:   · Constitutional: No Fever or Weight Loss   · Eyes: No Decreased Vision  · ENT: No Headaches, Hearing Loss or Vertigo  · Cardiovascular: No chest pain, dyspnea on exertion, palpitations or loss of consciousness  · Respiratory: No cough or wheezing    · Gastrointestinal: No abdominal pain, appetite loss, blood in stools, constipation, diarrhea or heartburn  · Genitourinary: No dysuria, trouble voiding, or hematuria  · Musculoskeletal:  No gait disturbance, weakness or joint complaints  · Integumentary: No rash or pruritis  · Neurological: No TIA or stroke symptoms  · Psychiatric: No anxiety or depression  · Endocrine: No malaise, fatigue or temperature intolerance  · Hematologic/Lymphatic: No bleeding problems, blood clots or swollen lymph nodes  · Allergic/Immunologic: No nasal congestion or hives  All systems negative except as marked.    Objective:  /70   Pulse 60   Ht 6' (1.829 m)   Wt 246 lb 3.2 oz (111.7 kg)   BMI 33.39 kg/m²   Wt Readings from Last 3 Encounters:   06/17/19 246 lb 3.2 oz (111.7 kg)   11/26/18 249 lb (112.9 kg)   08/21/18 253 lb 3.2 oz (114.9 kg)     Body mass index is 33.39 kg/m².  GENERAL - Alert, oriented, pleasant, in no apparent distress,normal grooming  HEENT - pupils are reactive to light and accomodation, cornea intact, conjunctive normal color, ears are normal in exam,throat exam in normal, teeth, gum and palate are normal, oral mucosa is normal without any notation of pallor or cyanosis  Neck - Supple. No jugular venous distention noted. No carotid bruits, no apical -carotid delay  Respiratory:  Normal breath sounds, No respiratory distress, No wheezing, No chest tenderness. ,no use of accessory muscles, diaphragm movement is normal  Cardiovascular: (PMI) apex non displaced,no lifts no thrills, no s3,no s4, Normal heart rate, Normal rhythm, No murmurs, No rubs, No gallops. Carotid arteries pulse and amplitude are normal no bruit, no abdominal bruit noted ( normal abdominal aorta ausculation), femoral arteries pulse and amplitude are normal no bruit, pedal pulses are normal  Femoral pulses have normal amplitude, no bruits   Extremities - No cyanosis, clubbing, or significant edema, no varicose veins    Abdomen - No masses, tenderness, or organomegaly, no hepato-splenomegally, no bruits  Musculoskeletal - No significant edema, no kyphosis or scoliosis, no deformity in any extremity noted, muscle strength and tone are normal  Skin: no ulcer,no scar,no stasis dermatitis   Neurologic - alert oriented times 3,Cranial nerves II through XII are grossly intact. There were no gross focal neurologic abnormalities.  All sensory and motor nerves examined and were normal  Psychiatric: normal mood and affect    No results found for: CKTOTAL, CKMB, CKMBINDEX, TROPONINI  BNP:  No results found for: BNP  PT/INR:  No results found for: PTINR  Lab Results   Component Value Date    LABA1C 5.3 09/04/2015     Lab Results   Component Value Date    CHOL 169 02/08/2017    TRIG 140 02/08/2017    HDL 29 (L) 02/08/2017    LDLCALC 112 (H) 02/08/2017    LDLDIRECT 117 (H) 10/17/2016     Lab Results Component Value Date    ALT 15 02/08/2017    AST 22 02/08/2017     TSH:  No results found for: TSH    Impression:  Tere Hernandez is a 68 y. o.year old who  has a past medical history of Arthritis, Blood circulation, collateral, Chronic back pain, Chronic venous hypertension with ulcer ([address] Difficult intubation, Edema, Enlarged prostate, Essential hypertension, H/O cardiovascular stress test, History of nuclear stress test, Hx of Venous Doppler ultrasound, HX OTHER MEDICAL, Hyperlipidemia, Lumbago, Neuromuscular disorder ([address] Neuropathy, Orthostatic hypotension, Osteoarthritis, PVD (peripheral vascular disease) ([address] Short-term memory loss, Thyroid disease, and Unstageable pressure ulcer of buttock ([address] and presents with     Plan:  1. preop for pain pump replacement: cleared from cardiac standpoint  2. Chest pain: normal patient stress test and echo  3. Leg swelling and stasis dermatitis; venous doppler showed left SFV venous insufficiency, will recommend to use compressions   4. HTN: stable, continue altace  5. Hypothyroidism: continue synthroid  6. Exhaustion; r/o hypothyroidism. 7. Dyslipidemia: stable      1. Health maintenance: exerise and diet  All labs, medications and tests reviewed, continue all other medications of all above medical condition listed as is.     [unfilled]

## 2019-06-20 NOTE — TELEPHONE ENCOUNTER
Patient is cleared for surgery/procedure at a moderate risk per Dr. Kenji Baker.   Letter completed and faxed

## 2020-12-03 ENCOUNTER — HOSPITAL ENCOUNTER (EMERGENCY)
Age: 75
Discharge: HOME | End: 2020-12-03
Attending: EMERGENCY MEDICINE
Payer: MEDICARE

## 2020-12-03 ENCOUNTER — APPOINTMENT (OUTPATIENT)
Dept: CT IMAGING | Age: 75
End: 2020-12-03
Payer: MEDICARE

## 2020-12-03 VITALS
DIASTOLIC BLOOD PRESSURE: 64 MMHG | SYSTOLIC BLOOD PRESSURE: 134 MMHG | OXYGEN SATURATION: 97 % | HEART RATE: 55 BPM | RESPIRATION RATE: 18 BRPM | BODY MASS INDEX: 32.23 KG/M2 | WEIGHT: 238 LBS | TEMPERATURE: 97.3 F | HEIGHT: 72 IN

## 2020-12-03 LAB
ALBUMIN SERPL-MCNC: 4.1 GM/DL (ref 3.4–5)
ALP BLD-CCNC: 99 IU/L (ref 40–129)
ALT SERPL-CCNC: 14 U/L (ref 10–40)
ANION GAP SERPL CALCULATED.3IONS-SCNC: 11 MMOL/L (ref 4–16)
AST SERPL-CCNC: 25 IU/L (ref 15–37)
BASOPHILS ABSOLUTE: 0 K/CU MM
BASOPHILS RELATIVE PERCENT: 0.5 % (ref 0–1)
BILIRUB SERPL-MCNC: 0.5 MG/DL (ref 0–1)
BUN BLDV-MCNC: 21 MG/DL (ref 6–23)
CALCIUM SERPL-MCNC: 9.5 MG/DL (ref 8.3–10.6)
CHLORIDE BLD-SCNC: 98 MMOL/L (ref 99–110)
CO2: 27 MMOL/L (ref 21–32)
CREAT SERPL-MCNC: 1.1 MG/DL (ref 0.9–1.3)
DIFFERENTIAL TYPE: ABNORMAL
EOSINOPHILS ABSOLUTE: 0.1 K/CU MM
EOSINOPHILS RELATIVE PERCENT: 2.4 % (ref 0–3)
GFR AFRICAN AMERICAN: >60 ML/MIN/1.73M2
GFR NON-AFRICAN AMERICAN: >60 ML/MIN/1.73M2
GLUCOSE BLD-MCNC: 90 MG/DL (ref 70–99)
HCT VFR BLD CALC: 42.6 % (ref 42–52)
HEMOGLOBIN: 13.5 GM/DL (ref 13.5–18)
IMMATURE NEUTROPHIL %: 0.2 % (ref 0–0.43)
LIPASE: 21 IU/L (ref 13–60)
LYMPHOCYTES ABSOLUTE: 1.7 K/CU MM
LYMPHOCYTES RELATIVE PERCENT: 27.9 % (ref 24–44)
MCH RBC QN AUTO: 30.1 PG (ref 27–31)
MCHC RBC AUTO-ENTMCNC: 31.7 % (ref 32–36)
MCV RBC AUTO: 95.1 FL (ref 78–100)
MONOCYTES ABSOLUTE: 0.6 K/CU MM
MONOCYTES RELATIVE PERCENT: 9.5 % (ref 0–4)
PDW BLD-RTO: 12.9 % (ref 11.7–14.9)
PLATELET # BLD: 216 K/CU MM (ref 140–440)
PMV BLD AUTO: 9.7 FL (ref 7.5–11.1)
POTASSIUM SERPL-SCNC: 4.5 MMOL/L (ref 3.5–5.1)
RBC # BLD: 4.48 M/CU MM (ref 4.6–6.2)
SEGMENTED NEUTROPHILS ABSOLUTE COUNT: 3.5 K/CU MM
SEGMENTED NEUTROPHILS RELATIVE PERCENT: 59.5 % (ref 36–66)
SODIUM BLD-SCNC: 136 MMOL/L (ref 135–145)
TOTAL IMMATURE NEUTOROPHIL: 0.01 K/CU MM
TOTAL PROTEIN: 7.5 GM/DL (ref 6.4–8.2)
TROPONIN T: <0.01 NG/ML
WBC # BLD: 5.9 K/CU MM (ref 4–10.5)

## 2020-12-03 PROCEDURE — 2580000003 HC RX 258: Performed by: EMERGENCY MEDICINE

## 2020-12-03 PROCEDURE — 85025 COMPLETE CBC W/AUTO DIFF WBC: CPT

## 2020-12-03 PROCEDURE — 83690 ASSAY OF LIPASE: CPT

## 2020-12-03 PROCEDURE — 93005 ELECTROCARDIOGRAM TRACING: CPT | Performed by: EMERGENCY MEDICINE

## 2020-12-03 PROCEDURE — 6370000000 HC RX 637 (ALT 250 FOR IP): Performed by: EMERGENCY MEDICINE

## 2020-12-03 PROCEDURE — 70450 CT HEAD/BRAIN W/O DYE: CPT

## 2020-12-03 PROCEDURE — 84484 ASSAY OF TROPONIN QUANT: CPT

## 2020-12-03 PROCEDURE — 74177 CT ABD & PELVIS W/CONTRAST: CPT

## 2020-12-03 PROCEDURE — 70496 CT ANGIOGRAPHY HEAD: CPT

## 2020-12-03 PROCEDURE — 80053 COMPREHEN METABOLIC PANEL: CPT

## 2020-12-03 PROCEDURE — 99284 EMERGENCY DEPT VISIT MOD MDM: CPT

## 2020-12-03 PROCEDURE — 6360000004 HC RX CONTRAST MEDICATION: Performed by: EMERGENCY MEDICINE

## 2020-12-03 RX ORDER — MECLIZINE HYDROCHLORIDE 25 MG/1
25 TABLET ORAL 3 TIMES DAILY PRN
Qty: 30 TABLET | Refills: 0 | Status: SHIPPED | OUTPATIENT
Start: 2020-12-03 | End: 2020-12-13

## 2020-12-03 RX ORDER — SODIUM CHLORIDE 0.9 % (FLUSH) 0.9 %
10 SYRINGE (ML) INJECTION PRN
Status: DISCONTINUED | OUTPATIENT
Start: 2020-12-03 | End: 2020-12-03 | Stop reason: HOSPADM

## 2020-12-03 RX ORDER — 0.9 % SODIUM CHLORIDE 0.9 %
500 INTRAVENOUS SOLUTION INTRAVENOUS ONCE
Status: COMPLETED | OUTPATIENT
Start: 2020-12-03 | End: 2020-12-03

## 2020-12-03 RX ORDER — MECLIZINE HCL 12.5 MG/1
25 TABLET ORAL ONCE
Status: COMPLETED | OUTPATIENT
Start: 2020-12-03 | End: 2020-12-03

## 2020-12-03 RX ORDER — PROMETHAZINE HYDROCHLORIDE 25 MG/1
25 TABLET ORAL EVERY 6 HOURS PRN
Qty: 15 TABLET | Refills: 0 | Status: SHIPPED | OUTPATIENT
Start: 2020-12-03 | End: 2020-12-10

## 2020-12-03 RX ADMIN — IOPAMIDOL 75 ML: 755 INJECTION, SOLUTION INTRAVENOUS at 19:55

## 2020-12-03 RX ADMIN — SODIUM CHLORIDE 500 ML: 9 INJECTION, SOLUTION INTRAVENOUS at 18:49

## 2020-12-03 RX ADMIN — SODIUM CHLORIDE, PRESERVATIVE FREE 10 ML: 5 INJECTION INTRAVENOUS at 19:57

## 2020-12-03 RX ADMIN — IOPAMIDOL 75 ML: 755 INJECTION, SOLUTION INTRAVENOUS at 19:56

## 2020-12-03 RX ADMIN — SODIUM CHLORIDE, PRESERVATIVE FREE 10 ML: 5 INJECTION INTRAVENOUS at 19:55

## 2020-12-03 RX ADMIN — MECLIZINE 25 MG: 12.5 TABLET ORAL at 18:49

## 2020-12-03 NOTE — ED PROVIDER NOTES
with [facility]     Difficult intubation     Edema     Enlarged prostate     Essential hypertension 4/19/2016    H/O cardiovascular stress test 08/06/2018    NM Normal study    History of nuclear stress test 08/06/2018    Normal LV function    Hx of Venous Doppler ultrasound 08/15/2018    No evidence of DVT or SVT No significant reflux noted in the veins of the right lower extremity. Significant reflux noted in the Left CFV and mid FV,    HX OTHER MEDICAL     \"have trouble swallowing sometimes and they have had trouble putting the tube down my throat in the past- have a letter will bring in copy for anesthesia    Hyperlipidemia     Lumbago     Neurom[facility] disorder [facility]     Neuropathy     Orthostatic hypotension     Osteoarthritis     [facility]) [facility]     Short-term memory loss     on medication    Thyroid disease     Unstageable pressure ulcer of buttock ([facility])        CURRENT MEDICATIONS:   Home medications reviewed.     SURGICAL HISTORY:   Past Surgical History:   Procedure Laterality Date    ADRENALECTOMY  1-6-1999    BACK SURGERY      \"had back surgery total of 6 times over past 25 yrs- last one 2005- first one was [address]      \"had colonoscopy 40 yrs ago\"    COLONOSCOPY N/A 11/27/2018    COLONOSCOPY POLYPECTOMY SNARE/COLD BIOPSY performed by Madelin Grullon MD at [place] [address].  9-    cataract removal right eye    EYE SURGERY  12-1-1997    cataract removal left eye    JOINT REPLACEMENT  1-    complete right hip     LAMINOTOMY  4-6-2005    microdiskectomy L3-L4    OTHER SURGICAL HISTORY      \"have infusion pump ( pain pump) put in 2008 and second one done 2015\"    TONSILLECTOMY  age 29    TOTAL HIP ARTHROPLASTY Left 2015    UPPER GASTROINTESTINAL ENDOSCOPY N/A 11/27/2018    EGD BIOPSY performed by Madelin Grullon MD at [facility]:   Family History   Problem Relation Age of Onset    Heart Disease Mother     High Blood Pressure Mother     Heart Disease Father     High Blood Pressure Father     Early Death Brother     Cancer Brother        SOCIAL HISTORY:   Social History     Socioeconomic History    Marital status:      Spouse name: Not on file    Number of children: Not on file    Years of education: Not on file    Highest education level: Not on file   Occupational History    Not on file   Social Needs    Financial resource strain: Not on file    Food insecurity     Worry: Not on file     Inability: Not on file   [employer] needs     Medical: Not on file     Non-medical: Not on file   Tobacco Use    Smoking status: Never Smoker    Smokeless tobacco: Never Used   Substance and Sexual Activity    Alcohol use: No    Drug use: No    Sexual activity: Not on file   Lifestyle    Physical activity     Days per week: Not on file     Minutes per session: Not on file    Stress: Not on file   Relationships    Social connections     Talks on phone: Not on file     Gets together: Not on file     Attends Christian service: Not on file     Active member of club or organization: Not on file     Attends meetings of clubs or organizations: Not on file     Relationship status: Not on file    Intimate partner violence     Fear of current or ex partner: Not on file     Emotionally abused: Not on file     Physically abused: Not on file     Forced sexual activity: Not on file   Other Topics Concern    Not on file   Social History Narrative    Not on file       ALLERGIES: Patient has no known allergies. PHYSICAL EXAM:  VITAL SIGNS:   ED Triage Vitals   Enc Vitals Group      BP       Pulse       Resp       Temp       Temp src       SpO2       Weight       Height       Head Circumference       Peak Flow       Pain Score       Pain Loc       Pain Edu? Excl. in [address]?       Constitutional:  Non-toxic appearance  HENT: Normocephalic, Atraumatic, Bilateral external ears normal, bilateral cerumen impaction, oropharynx moist, No oral exudates, Nose normal.  Eyes:  PERRL, EOMI, Conjunctiva normal, No discharge, no nystagmus  Neck: Normal range of motion, No tenderness, Supple, No stridor, No lymphadenopathy. Cardiovascular: Bradycardic, regular rhythm  Pulmonary/Chest:  Normal breath sounds, No respiratory distress, No wheezing  Abdomen: Bowel sounds normal, Soft, No tenderness, No masses, No pulsatile masses  Back:  No tenderness, No CVA tenderness  Extremities:  Normal range of motion, Intact distal pulses, No edema, No tenderness  Neurologic:  Alert & oriented x 3, Normal motor function, Sensation intact to light touch throughout, No focal deficits  Skin:  Warm, Dry, No erythema, No rash      EKG Interpretation  Interpreted by me  Compared to 7/31/2018  Rhythm: sinus bradycardia  Rate: bradycardic 56  Axis: normal  Ectopy: none  Conduction: normal  ST Segments: no acute change  T Waves: no acute change  Clinical Impression: sinus bradycardia, no acute change    Cardiac Monitor Strip Interpretation  Interpreted by me  Monitor strip interpreted for greater than 10 seconds  Rhythm: sinus bradycardia  Rate: bradycardic  Ectopy: none  ST Segments: normal      Radiology / Procedures:       CTA HEAD W CONTRAST (Final result)   Result time 12/03/20 20:18:07   Final result by Lisa Houston MD (12/03/20 20:18:07)                 Impression:     No hemodynamically significant stenosis involving the intracranial arteries. Narrative:     EXAMINATION:   CTA OF THE HEAD WITH CONTRAST 12/3/2020 7:29 pm:     TECHNIQUE:   CTA of the head/brain was performed with the administration of intravenous   contrast. Multiplanar reformatted images are provided for review.  MIP images   are provided for review. Dose modulation, iterative reconstruction, and/or   weight based adjustment of the mA/kV was utilized to reduce the radiation   dose to as low as reasonably achievable. COMPARISON:   None.      HISTORY: ORDERING SYSTEM PROVIDED HISTORY: Dizziness   TECHNOLOGIST PROVIDED HISTORY:   Reason for exam:->Dizziness   Has a \"code stroke\" or \"stroke alert\" been called? ->No   Reason for Exam: dizziness   Acuity: Acute   Type of Exam: Subsequent/Follow-up   Relevant Medical/Surgical History: pt very kyphotic and stiff, unable to   center head in gantry.  best scan possible with pt 's conditions     FINDINGS:   ANTERIOR CIRCULATION: No significant stenosis of the intracranial internal   carotid, anterior cerebral, or middle cerebral arteries. No aneurysm. POSTERIOR CIRCULATION: No significant stenosis of the vertebral, basilar, or   posterior cerebral arteries. No aneurysm. OTHER: No dural venous sinus thrombosis on this non-dedicated study. BRAIN: No mass effect or midline shift. No extra-axial fluid collection. The   gray-white differentiation is maintained.                       CT ABDOMEN PELVIS W IV CONTRAST Additional Contrast? None (Final result)   Result time 12/03/20 20:11:36   Final result by Salvador Scott MD (12/03/20 20:11:36)                 Impression:     No acute finding in the abdomen or pelvis. Suspect small sandlike gallstones in the dependent portion of the   gallbladder.  No evidence of cholecystitis. Mild sigmoid colon diverticulosis. Thoracolumbar spine and sacroiliac joint findings consistent with ankylosing   spondylitis. Narrative:     EXAMINATION:   CT OF THE ABDOMEN AND PELVIS WITH CONTRAST 12/3/2020 7:14 pm     TECHNIQUE:   CT of the abdomen and pelvis was performed with the administration of   intravenous contrast. Multiplanar reformatted images are provided for review. Dose modulation, iterative reconstruction, and/or weight based adjustment of   the mA/kV was utilized to reduce the radiation dose to as low as reasonably   achievable.      COMPARISON:   09/06/2011     HISTORY:   ORDERING SYSTEM PROVIDED HISTORY: Nausea, diarrhea   TECHNOLOGIST PROVIDED HISTORY:   Reason for exam:->Nausea, diarrhea   Additional Contrast?->None   Reason for Exam: Nausea, diarrhea   Acuity: Acute   Type of Exam: Initial   Relevant Medical/Surgical History: pt unable to raise arms above head, unable   to lay flat on back     FINDINGS:   Lower Chest: Lung bases are clear and the heart size is normal.  Calcific   coronary artery disease. Organs: Suspicion of small sandlike gallstones layering in the dependent   portion of the gallbladder.  No pericholecystic fluid or fat stranding.  The   liver, spleen, pancreas, adrenal glands and kidneys are normal.  There are   several unchanged surgical clips in the right infrahepatic space.  There is a   supplied clinical history of an adrenalectomy.  Both adrenal glands remain. GI/Bowel: Mild sigmoid colon diverticulosis.  No evidence of diverticulitis. No evidence of bowel obstruction.  The appendix is normal.     Pelvis: There are bilateral hip arthroplasties causing artifact degrading   images of the lower pelvis.  Urinary bladder is grossly normal.     Peritoneum/Retroperitoneum: There is no adenopathy, free air or free fluid. Calcific aorto iliac atherosclerotic disease with no evidence of an aneurysm. Bones/Soft Tissues: There is ankylosis of the lower thoracic and lumbar   spine.  Partial fusion of the bilateral sacroiliac joints.  Lower thoracic   spine stimulator.                       CT HEAD WO CONTRAST (Final result)   Result time 12/03/20 17:52:40   Final result by Owen Garcia MD (12/03/20 17:52:40)                 Impression:     Motion limited exam demonstrating atrophy and small vessel ischemic disease. If there is strong clinical concern for acute on chronic ischemia, consider   MR.              Narrative:     EXAMINATION:   CT OF THE HEAD WITHOUT CONTRAST  12/3/2020 5:00 pm     TECHNIQUE:   CT of the head was performed without the administration of intravenous   contrast. Dose modulation, iterative reconstruction, and/or weight based   adjustment of the mA/kV was utilized to reduce the radiation dose to as low   as reasonably achievable. COMPARISON:   None. HISTORY:   ORDERING SYSTEM PROVIDED HISTORY: Dizziness   TECHNOLOGIST PROVIDED HISTORY:   Has a \"code stroke\" or \"stroke alert\" been called? ->No   Reason for exam:->Dizziness     FINDINGS:   BRAIN/VENTRICLES: Motion artifact and there is artifact about the periphery   of the cerebrum.  No mass effect or midline shift.  No hydrocephalus.  Gray   matter white matter differentiation is preserved.  Sulcation of the temporal   lobes is not well defined though this is bilateral and may be related to the   peripheral artifact.  No gross acute hemorrhage.  Mild atrophy and   periventricular white matter hypodensity is seen.  Probable temporal horn   ventricular calcification on the right on image 26. ORBITS: The visualized portion of the orbits demonstrate no acute abnormality. SINUSES: Minimal opacification within sphenoid sinus.  Sinuses as a whole are   otherwise aerated. SOFT TISSUES/SKULL:  No acute abnormality of the visualized skull or soft   tissues.                    Labs Reviewed   CBC WITH AUTO DIFFERENTIAL - Abnormal; Notable for the following components:       Result Value    RBC 4.48 (*)     MCHC 31.7 (*)     Monocytes % 9.5 (*)     All other components within normal limits   COMPREHENSIVE METABOLIC PANEL - Abnormal; Notable for the following components:    Chloride 98 (*)     All other components within normal limits   TROPONIN   LIPASE       ED COURSE & MEDICAL DECISION MAKING:  Pertinent Labs & Imaging studies reviewed. (See chart for details)  On exam, the patient is afebrile and nontoxic appearing. He is mildly bradycardic but is asymptomatic. He is otherwise hemodynamically stable and neurologically intact. EKG shows sinus bradycardia with no ST elevation or depression.   He complains of vertigo that has been ongoing for the past 2 months. Stroke alert was not called due to the duration of the symptoms. Labs are obtained and there are no clinically significant lab abnormalities. CT head was obtained and shows atrophy and small vessel ischemic disease with no acute abnormality. CT abdomen pelvis is obtained and is negative for acute abnormality. There are small sand-like gallstones in the dependent portion of the gallbladder with no evidence of cholecystitis. There is mild sigmoid: Diverticulosis with no diverticulitis. There is ankylosing spondylitis. CTA of the head is negative for hemodynamically significant stenosis involving the intracranial arteries. He was treated with IV normal saline and Antivert with some improvement. The etiology of the patient's symptoms is unclear at this time. He is likely having peripheral vertigo, however I am unable to completely exclude central vertigo. Symptoms have been ongoing for the past 2 months which makes an acute stroke unlikely. I discussed transfer for admission to the hospital for further work-up with the patient and his wife, but they declined stating that they wanted to go home and continue in outpatient work-up. They are to follow-up in the next 2 to 3 days. Return precautions are given. The patient verbalized understanding, was agreeable with plan, and the patient was discharged home in stable condition. Clinical Impression:  1. Vertigo    2. Lightheadedness    3. Generalized weakness    4.  Nausea        Disposition referral (if applicable):  RAJANI Ruiz   [phone]    Schedule an appointment as soon as possible for a visit       Angelia Sidhu MD  [address] [facility]  [phone]    Schedule an appointment as soon as possible for a visit       Your neurologist    Schedule an appointment as soon as possible for a visit       [facility] [address] [place] [address]  [phone]  Go to   If symptoms worsen      Disposition medications (if applicable):  Discharge Medication List as of 12/3/2020  8:41 PM      START taking these medications    Details   meclizine (ANTIVERT) 25 MG tablet Take 1 tablet by mouth 3 times daily as needed for Dizziness, Disp-30 tablet,R-0Print      promethazine (PHENERGAN) 25 MG tablet Take 1 tablet by mouth every 6 hours as needed for Nausea, Disp-15 tablet,R-0Print               Comment: Please note this report has been produced using speech recognition software and may contain errors related to that system including errors in grammar, punctuation, and spelling, as well as words and phrases that may be inappropriate. If there are any questions or concerns please feel free to contact the dictating provider for clarification.         Sky Purvis MD  12/12/20 [id number]

## 2020-12-03 NOTE — ED TRIAGE NOTES
Pt arrives to ed with wife. Pt reports vomiting and diarrhea since 10/24/20. Pt has been seen by pcp via telemedicine and had a covid test within the last week which was negative. Pt denies having had further workup beyond covid test. Pt reports intermittent sharp abd cramping, none at present.

## 2020-12-04 PROCEDURE — 93010 ELECTROCARDIOGRAM REPORT: CPT | Performed by: INTERNAL MEDICINE

## 2020-12-08 LAB
EKG ATRIAL RATE: 56 BPM
EKG DIAGNOSIS: NORMAL
EKG P AXIS: 67 DEGREES
EKG P-R INTERVAL: 212 MS
EKG Q-T INTERVAL: 452 MS
EKG QRS DURATION: 96 MS
EKG QTC CALCULATION (BAZETT): 436 MS
EKG R AXIS: 16 DEGREES
EKG T AXIS: 64 DEGREES
EKG VENTRICULAR RATE: 56 BPM

## 2021-05-03 ENCOUNTER — APPOINTMENT (OUTPATIENT)
Dept: CT IMAGING | Age: 76
DRG: 312 | End: 2021-05-03
Payer: MEDICARE

## 2021-05-03 ENCOUNTER — HOSPITAL ENCOUNTER (INPATIENT)
Age: 76
LOS: 3 days | Discharge: HOME | DRG: 312 | End: 2021-05-06
Attending: EMERGENCY MEDICINE | Admitting: INTERNAL MEDICINE
Payer: MEDICARE

## 2021-05-03 DIAGNOSIS — R53.1 GENERALIZED WEAKNESS: ICD-10-CM

## 2021-05-03 DIAGNOSIS — R00.1 BRADYCARDIA: ICD-10-CM

## 2021-05-03 DIAGNOSIS — R55 SYNCOPE AND COLLAPSE: Primary | ICD-10-CM

## 2021-05-03 DIAGNOSIS — R42 LIGHTHEADEDNESS: ICD-10-CM

## 2021-05-03 LAB
ALBUMIN SERPL-MCNC: 4 GM/DL (ref 3.4–5)
ALP BLD-CCNC: 90 IU/L (ref 40–129)
ALT SERPL-CCNC: 11 U/L (ref 10–40)
ANION GAP SERPL CALCULATED.3IONS-SCNC: 9 MMOL/L (ref 4–16)
AST SERPL-CCNC: 21 IU/L (ref 15–37)
BACTERIA: NEGATIVE /HPF
BASOPHILS ABSOLUTE: 0 K/CU MM
BASOPHILS RELATIVE PERCENT: 0.5 % (ref 0–1)
BILIRUB SERPL-MCNC: 0.3 MG/DL (ref 0–1)
BILIRUBIN URINE: NEGATIVE MG/DL
BLOOD, URINE: NEGATIVE
BUN BLDV-MCNC: 19 MG/DL (ref 6–23)
CALCIUM SERPL-MCNC: 9.3 MG/DL (ref 8.3–10.6)
CAST TYPE: ABNORMAL /HPF
CHLORIDE BLD-SCNC: 99 MMOL/L (ref 99–110)
CLARITY: CLEAR
CO2: 30 MMOL/L (ref 21–32)
COLOR: YELLOW
CREAT SERPL-MCNC: 1 MG/DL (ref 0.9–1.3)
CRYSTAL TYPE: NEGATIVE /HPF
DIFFERENTIAL TYPE: ABNORMAL
EOSINOPHILS ABSOLUTE: 0.2 K/CU MM
EOSINOPHILS RELATIVE PERCENT: 2.5 % (ref 0–3)
EPITHELIAL CELLS, UA: 2 /HPF
GFR AFRICAN AMERICAN: >60 ML/MIN/1.73M2
GFR NON-AFRICAN AMERICAN: >60 ML/MIN/1.73M2
GLUCOSE BLD-MCNC: 95 MG/DL (ref 70–99)
GLUCOSE, URINE: NEGATIVE MG/DL
HCT VFR BLD CALC: 41.2 % (ref 42–52)
HEMOGLOBIN: 13.2 GM/DL (ref 13.5–18)
IMMATURE NEUTROPHIL %: 0.2 % (ref 0–0.43)
KETONES, URINE: NEGATIVE MG/DL
LEUKOCYTE ESTERASE, URINE: NEGATIVE
LYMPHOCYTES ABSOLUTE: 1.4 K/CU MM
LYMPHOCYTES RELATIVE PERCENT: 23.6 % (ref 24–44)
MCH RBC QN AUTO: 30.7 PG (ref 27–31)
MCHC RBC AUTO-ENTMCNC: 32 % (ref 32–36)
MCV RBC AUTO: 95.8 FL (ref 78–100)
MONOCYTES ABSOLUTE: 0.5 K/CU MM
MONOCYTES RELATIVE PERCENT: 7.6 % (ref 0–4)
NITRITE URINE, QUANTITATIVE: NEGATIVE
PDW BLD-RTO: 12.6 % (ref 11.7–14.9)
PH, URINE: 5 (ref 5–8)
PLATELET # BLD: 191 K/CU MM (ref 140–440)
PMV BLD AUTO: 9.8 FL (ref 7.5–11.1)
POTASSIUM SERPL-SCNC: 4.4 MMOL/L (ref 3.5–5.1)
PROTEIN UA: NEGATIVE MG/DL
RBC # BLD: 4.3 M/CU MM (ref 4.6–6.2)
RBC URINE: ABNORMAL /HPF (ref 0–3)
SEGMENTED NEUTROPHILS ABSOLUTE COUNT: 3.9 K/CU MM
SEGMENTED NEUTROPHILS RELATIVE PERCENT: 65.6 % (ref 36–66)
SODIUM BLD-SCNC: 138 MMOL/L (ref 135–145)
SPECIFIC GRAVITY UA: 1.02 (ref 1–1.03)
TOTAL IMMATURE NEUTOROPHIL: 0.01 K/CU MM
TOTAL PROTEIN: 7.1 GM/DL (ref 6.4–8.2)
TROPONIN T: <0.01 NG/ML
UROBILINOGEN, URINE: 0.2 MG/DL (ref 0.2–1)
WBC # BLD: 5.9 K/CU MM (ref 4–10.5)
WBC UA: ABNORMAL /HPF (ref 0–2)

## 2021-05-03 PROCEDURE — 70450 CT HEAD/BRAIN W/O DYE: CPT

## 2021-05-03 PROCEDURE — 71275 CT ANGIOGRAPHY CHEST: CPT

## 2021-05-03 PROCEDURE — 99285 EMERGENCY DEPT VISIT HI MDM: CPT

## 2021-05-03 PROCEDURE — 81001 URINALYSIS AUTO W/SCOPE: CPT

## 2021-05-03 PROCEDURE — 84484 ASSAY OF TROPONIN QUANT: CPT

## 2021-05-03 PROCEDURE — 85025 COMPLETE CBC W/AUTO DIFF WBC: CPT

## 2021-05-03 PROCEDURE — G0378 HOSPITAL OBSERVATION PER HR: HCPCS

## 2021-05-03 PROCEDURE — 80053 COMPREHEN METABOLIC PANEL: CPT

## 2021-05-03 PROCEDURE — 93005 ELECTROCARDIOGRAM TRACING: CPT | Performed by: EMERGENCY MEDICINE

## 2021-05-03 PROCEDURE — 6360000004 HC RX CONTRAST MEDICATION: Performed by: EMERGENCY MEDICINE

## 2021-05-03 PROCEDURE — 2140000000 HC CCU INTERMEDIATE R&B

## 2021-05-03 RX ORDER — MAGNESIUM CHLORIDE 64 MG
1 TABLET, DELAYED RELEASE (ENTERIC COATED) ORAL 2 TIMES DAILY
COMMUNITY

## 2021-05-03 RX ORDER — DULOXETIN HYDROCHLORIDE 30 MG/1
60 CAPSULE, DELAYED RELEASE ORAL DAILY
COMMUNITY

## 2021-05-03 RX ADMIN — IOPAMIDOL 75 ML: 755 INJECTION, SOLUTION INTRAVENOUS at 17:12

## 2021-05-03 NOTE — ED NOTES
Contacted [facility] to begin the transfer process.                          Gigi Zavala, RN  05/03/21 1870

## 2021-05-03 NOTE — ED PROVIDER NOTES
EMERGENCY DEPARTMENT ENCOUNTER    Patient: Benjie Stanley  MRN: [id number]  : 1945  Date of Evaluation: 5/3/2021  ED Provider:  Manjula Arevalo    CHIEF COMPLAINT  Chief Complaint   Patient presents with    Dizziness       HPI  Benjie Stanley is a 76 y.o. male who presents with complaints of moderate to severe, constant generalized weakness and fatigue with lack of energy with lightheadedness and unsteadiness for 10 days. No known triggering or modifying factors. Also reports she has had some intermittent right-sided chest pain over this time. Denies substernal or left-sided chest pain. Denies any shortness of breath or difficulty breathing. Denies abdominal pain. Has had some nausea without emesis. States last week he was having some constipation and took laxatives and then had a lot of diarrhea after that but this has resolved. Denies any urinary symptoms. Denies fever chills. Denies skin changes or rashes. He also reports that he has had 2 syncopal episodes over the last several days but did not fall or hit his head or have any injury during these episodes. Denies any other associated symptoms or complaints or concerns.       REVIEW OF SYSTEMS    Constitutional: negative for fever, chills  Neurological: negative for HA, focal weakness, loss of sensation  Ophthalmic: negative for vision change  ENT: negative for congestion, rhinorrhea  Cardiovascular: negative for obsessions, edema  Respiratory: negative for SOB, cough  GI: negative for abdominal pain, vomiting,   : negative for dysuria, hematuria  Musculoskeletal: negative for myalgias, decreased ROM, joint swelling  Dermatological: negative for rash, wounds  Heme: Negative for bleeding, bruising      PAST MEDICAL HISTORY  Past Medical History:   Diagnosis Date    Arthritis     Blood circulation, collateral     Chronic back pain     Chronic venous hypertension with ulcer ([address]     Difficult intubation     Edema     Enlarged prostate     Essential hypertension 4/19/2016    H/O cardiovascular stress test 08/06/2018    NM Normal study    History of nuclear stress test 08/06/2018    Normal LV function    Hx of Venous Doppler ultrasound 08/15/2018    No evidence of DVT or SVT No significant reflux noted in the veins of the right lower extremity.  Significant reflux noted in the Left CFV and mid FV,    HX OTHER MEDICAL     \"have trouble swallowing sometimes and they have had trouble putting the tube down my throat in the past- have a letter will bring in copy for anesthesia    Hyperlipidemia     Lumbago     Neuromuscular disorder ([address]     Neuropathy     Orthostatic hypotension     Osteoarthritis     PVD (peripheral vascular disease) ([address]     Short-term memory loss     on medication    Thyroid disease     Unstageable pressure ulcer of buttock ([address]        CURRENT MEDICATIONS  [unfilled]    ALLERGIES  Allergies   Allergen Reactions    Fish Allergy Nausea And Vomiting       SURGICAL HISTORY  Past Surgical History:   Procedure Laterality Date    ADRENALECTOMY  1-6-1999    BACK SURGERY      \"had back surgery total of 6 times over past 25 yrs- last one 2005- first one was [address]      \"had colonoscopy 40 yrs ago\"    COLONOSCOPY N/A 11/27/2018    COLONOSCOPY POLYPECTOMY SNARE/COLD BIOPSY performed by Laura Banks MD at [address]  9-    cataract removal right eye    EYE SURGERY  12-1-1997    cataract removal left eye    JOINT REPLACEMENT  1-    complete right hip     LAMINOTOMY  4-6-2005    microdiskectomy L3-L4    OTHER SURGICAL HISTORY      \"have infusion pump ( pain pump) put in 2008 and second one done 2015\"    TONSILLECTOMY  age 29    TOTAL HIP ARTHROPLASTY Left 2015    UPPER GASTROINTESTINAL ENDOSCOPY N/A 11/27/2018    EGD [facility]SY performed by [facility] at [facility]  Family History   Problem Relation Age of Onset    Heart Disease Mother     High Blood Pressure Mother     Heart Disease Father     High Blood Pressure Father     Early Death Brother     Cancer Brother        SOCIAL HISTORY  Social History     Socioeconomic History    Marital status:      Spouse name: None    Number of children: None    Years of education: None    Highest education level: None   Occupational History    None   Social Needs    Financial resource strain: None    Food insecurity     Worry: None     Inability: None    Transportation needs     Medical: None     Non-medical: None   Tobacco Use    Smoking status: Never Smoker    Smokeless tobacco: Never Used   Substance and Sexual Activity    Alcohol use: No    Drug use: No    Sexual activity: None   Lifestyle    Physical activity     Days per week: None     Minutes per session: None    Stress: None   Relationships    Social connections     Talks on phone: None     Gets together: None     Attends Sikh service: None     Active member of club or organization: None     Attends meetings of clubs or organizations: None     Relationship status: None    Intimate partner violence     Fear of current or ex partner: None     Emotionally abused: None     Physically abused: None     Forced sexual activity: None   Other Topics Concern    None   Social History Narrative    None         **Past medical, family and social histories, and nursing notes reviewed and verified by me**      PHYSICAL EXAM  VITAL SIGNS:   ED Triage Vitals [05/03/21 1459]   Enc Vitals Group      /64      Pulse 55      Resp 16      Temp 97.6 °F (36.4 °C)      Temp Source Infrared      SpO2 96 %      Weight 242 lb (109.8 kg)      Height 6' (1.829 m)      Head Circumference       Peak Flow       Pain Score       Pain Loc       Pain Edu? Excl. in [address]? Vitals during ED course were reviewed and are as charted.     Constitutional: Minimal distress, Non-toxic appearance  Eyes: Conjunctiva normal, No discharge, PERRL, EOMI  HENT: Normocephalic, Atraumatic, bilateral external ears normal, posterior oropharynx is nonerythematous and without exudate, uvula is midline, no trismus, no \"hot potato voice\" or dysphonia, oropharynx moist  Neck: Supple, no stridor, no grossly visible or palpable masses  Cardiovascular: Regular rate and rhythm, No murmurs, No rubs, No gallops, no JVD, no carotid bruits  Pulmonary/Chest: Normal breath sounds, No respiratory distress or accessory muscle use, No wheezing, crackles or rhonchi. Abdomen: Soft, nondistended and nonrigid, No tenderness or peritoneal signs, No masses, normal bowel sounds  Back: No midline point tenderness, No paraspinous muscle tenderness. No CVA tenderness  Extremities: No gross deformities, no edema, no tenderness  Skin: Warm, Dry, No erythema, No rash, No cyanosis, No mottling  Lymphatic: No lymphadenopathy in the following location(s): cervical  Psychiatric: Alert and oriented x3, Affect normal    Neurologic:   Mental Status Exam:   Alert and oriented times three, follows commands, speech and language intact    Cranial [id number] Intact as tested.     Cranial nerve II  Visual acuity: normal  Cranial nerve III  Pupils: equal, round, reactive to light  Cranial nerves III, IV, VI  Extraocular Movements: intact  Cranial nerve V  Facial sensation: intact  Cranial nerve VII  Facial strength: intact  Cranial nerve VIII  Hearing: intact  Cranial nerve IX  Palate: intact  Cranial nerve XI  Shoulder shrug: intact  Cranial nerve XII  Tongue movement: normal    Motor:   Drift: absent  Motor exam is symmetrical 5 out of 5 all extremities bilaterally  Tone: normal  Abnormal Movements: Absent    DTRs- intact bilaterally and symmetrical.  Toes-downgoing bilaterally  Sensory:  Light Touch and Pinprick  Right Upper Extremity: normal  Left Upper Extremity: normal  Right Lower Extremity: normal  Left Lower Extremity: normal    Normal finger-to-nose, normal heel-to-shin, normal rapid alternating hand movements, no pronator drift    NIH Stroke Score:  1A: Level of Consciousness  Alert; keenly responsive 0  Arouses to minor stimulation +1  Requires repeated stimulation to arouse +2  Movements to Pain +2  Postures or Unresponsive +3     1B: Ask Month and Age  Both Questions Right 0  1 Question Right +1  0 Questions Right +2  Dysarthric/Intubated/ Trauma/Language Barrier +1  Aphasic +2     1C: 'Blink Eyes' & 'Squeeze Hands'(Pantomime Commands if Communication Barrier)  Performs Both Tasks0  Performs 1 Task+1  Performs 0 Tasks+2     2: Test Horizontal Extraocular Movements  Normal 0  Partial Gaze Palsy: Can Be Overcome +1  Partial Gaze Palsy: Corrects with Oculocephalic Reflex +1  Forced Gaze Palsy: Cannot Be Overcome +2     3: Test Visual Fields  No Visual Loss 0  Partial Hemianopia +1  Complete Hemianopia +2  Patient is Bilaterally Blind +3  Bilateral Hemianopia +3     4: Test Facial Palsy(Use Grimace if Obtunded)  Normal symmetry 0  Minor paralysis (flat nasolabial fold, smile asymmetry) +1  Partial paralysis (lower face) +2  Unilateral Complete paralysis (upper/lower face) +3  Bilateral Complete paralysis (upper/lower face) +3     5A: Test Left Arm Motor Drift  Amputation/Joint Fusion 0  No Drift for 10 Seconds 0  Drift, but doesn't hit bed +1  Drift, hits bed +2  Some Effort Against Gravity +2  No Effort Against Gravity +3  No Movement +4     5B:  Test Right Arm Motor Drift  Amputation/Joint Fusion 0  No Drift for 10 Seconds 0  Drift, but doesn't hit bed +1  Drift, hits bed +2  Some Effort Against Gravity +2  No Effort Against Gravity +3  No Movement +4     6A: Test Left Leg Motor Drift  Amputation/Joint Fusion 0  No Drift for 5 Seconds 0  Drift, but doesn't hit bed +1  Drift, hits bed +2  Some Effort Against Gravity +2  No Effort Against Gravity +3  No Movement +4     6B: Test Right Leg Motor Drift  Amputation/Joint Fusion 0  No Drift for 5 Seconds 0  Drift, but doesn't hit bed +1  Drift, hits bed +2  Some Effort Against Gravity +2  No Effort Against Gravity +3  No Movement +4     7: Test Limb Ataxia(FTN/Heel-Shin)  Amputation/Joint Fusion 0  Does Not Understand 0  Paralyzed 0  No Ataxia 0  Ataxia in 1 Limb +1  Ataxia in 2 Limbs +2     8: Test Sensation  Normal; No sensory loss 0  Mild-Moderate Loss: Less Sharp/More Dull +1  Mild-Moderate Loss: Can Sense Being Touched +1  Complete Loss: Cannot Sense Being Touched At All +2  No Response and Quadriplegic +2  Coma/Unresponsive +2     9: Test Language/Aphasia (Describe the scene; name the words; read the sentences)  Normal; No aphasia 0  Mild-Moderate Aphasia: Some Obvious Changes, Without Significant Limitation +1  Severe Aphasia: Fragmentary Expression, Inference Needed, Cannot Identify  Materials +2  Mute/Global Aphasia: No Usable Speech/Auditory Comprehension +3  Coma/Unresponsive +3     10: Test Dysarthria (Read the words)  Intubated/Unable to Test 0  Normal 0  Mild-Moderate Dysarthria: Slurring but can be understood +1  Severe Dysarthria: Unintelligble Slurring or Out of Proportion to Dysphasia +2  Mute/Anarthric +2     11: Test Extinction/Inattention  No abnormality 0  Visual/tactile/auditory/spatial/personal inattention +1  Extinction to bilateral simultaneous stimulation +1  Profound rajeev-inattention (ex: does not recognize own hand) +2  Extinction to >1 modality +2     NIH score is 0        EKG Interpretation    Interpreted by emergency department physician    Rhythm: sinus bradycardia  Rate: 50-60  Axis: normal  Ectopy: none  Conduction: 1st degree AV block  ST Segments: normal  T Waves: normal  Q Waves: none    Clinical Impression: Sinus bradycardia with first-degree AV block        RADIOLOGY/PROCEDURES/LABS/MEDICATIONS ADMINISTERED:    I have reviewed and interpreted all of the currently available lab results from this visit (if applicable):  Results for orders placed or performed during the hospital encounter of 05/03/21   CBC Auto Differential Result Value Ref Range    WBC 5.9 4.0 - 10.5 K/CU MM    RBC 4.30 (L) 4.6 - 6.2 M/CU MM    Hemoglobin 13.2 (L) 13.5 - 18.0 GM/DL    Hematocrit 41.2 (L) 42 - 52 %    MCV 95.8 78 - 100 FL    MCH 30.7 27 - 31 PG    MCHC 32.0 32.0 - 36.0 %    RDW 12.6 11.7 - 14.9 %    Platelets 039 480 - 813 K/CU MM    MPV 9.8 7.5 - 11.1 FL    Differential Type AUTOMATED DIFFERENTIAL     Segs Relative 65.6 36 - 66 %    Lymphocytes % 23.6 (L) 24 - 44 %    Monocytes % 7.6 (H) 0 - 4 %    Eosinophils % 2.5 0 - 3 %    Basophils % 0.5 0 - 1 %    Segs Absolute 3.9 K/CU MM    Lymphocytes Absolute 1.4 K/CU MM    Monocytes Absolute 0.5 K/CU MM    Eosinophils Absolute 0.2 K/CU MM    Basophils Absolute 0.0 K/CU MM    Immature Neutrophil % 0.2 0 - 0.43 %    Total Immature Neutrophil 0.01 K/CU MM   Comprehensive Metabolic Panel w/ Reflex to MG   Result Value Ref Range    Sodium 138 135 - 145 MMOL/L    Potassium 4.4 3.5 - 5.1 MMOL/L    Chloride 99 99 - 110 mMol/L    CO2 30 21 - 32 MMOL/L    BUN 19 6 - 23 MG/DL    CREATININE 1.0 0.9 - 1.3 MG/DL    Glucose 95 70 - 99 MG/DL    Calcium 9.3 8.3 - 10.6 MG/DL    Albumin 4.0 3.4 - 5.0 GM/DL    Total Protein 7.1 6.4 - 8.2 GM/DL    Total Bilirubin 0.3 0.0 - 1.0 MG/DL    ALT 11 10 - 40 U/L    AST 21 15 - 37 IU/L    Alkaline Phosphatase 90 40 - 129 IU/L    GFR Non-African American >60 >60 mL/min/1.73m2    GFR African American >60 >60 mL/min/1.73m2    Anion Gap 9 4 - 16   Troponin   Result Value Ref Range    Troponin T <0.010 <0.01 NG/ML   Urinalysis with microscopic   Result Value Ref Range    Color, UA YELLOW (A) YELLOW    Clarity, UA CLEAR CLEAR    Glucose, Urine NEGATIVE NEGATIVE MG/DL    Bilirubin Urine NEGATIVE NEGATIVE MG/DL    Ketones, Urine NEGATIVE NEGATIVE MG/DL    Specific Gravity, UA 1.020 1.001 - 1.035    Blood, Urine NEGATIVE NEGATIVE    pH, Urine 5.0 5.0 - 8.0    Protein, UA NEGATIVE NEGATIVE MG/DL    Urobilinogen, Urine 0.2 0.2 - 1.0 MG/DL    Nitrite Urine, Quantitative NEGATIVE NEGATIVE normal detailed neurologic exam, which is highly sensitive for dangerous causes of dizziness, vertigo, or loss of balance. The evidence indicates that the patient is very low risk for an acute neurologic emergency and this is consistent with my clinical intuition. The risk of further workup or hospitalization is likely higher than the risk of the patient having a stroke or other dangerous neurologic condition. Patient has been noted to be bradycardic. This does appear to be a sinus bradycardia on EKG. The initial EKG printout was reading as junctional rhythm however I am able to make out some P waves however the OK interval does appear prolonged and is likely a first-degree AV block. Blood pressure however has been very reassuring and stable throughout his entire ED course. He has had a couple of syncopal episodes over the last week however. Clinically there is no evidence for acute coronary syndrome at this present time. No evidence for acute neurological process. No evidence for an acute electrolyte or metabolic abnormality or acute infectious process. Additional workup and treatment in the ED as documented above. Pt will be admitted for further evaluation and treatment. I discussed the case with the hospitalist, who agreed to admit the patient. Plan discussed with pt and/or family who expressed understanding and agreement with plan. Clinical Impression:  1. Syncope and collapse    2. Lightheadedness    3. Generalized weakness    4. Bradycardia        Disposition referral (if applicable):  No follow-up provider specified. Disposition medications (if applicable):  New Prescriptions    No medications on file       ED Provider Disposition Time  DISPOSITION            Electronically signed by: Ganesh Manzo M.D., 5/3/2021 6:43 PM      This dictation was created with voice recognition software.  While attempts have been made to review the dictation as it is transcribed, on occasion the spoken word can be misinterpreted by the technology leading to omissions or inappropriate words, phrases or sentences.         Hayden Miller MD  05/04/21 4795

## 2021-05-04 ENCOUNTER — APPOINTMENT (OUTPATIENT)
Dept: MRI IMAGING | Age: 76
DRG: 312 | End: 2021-05-04
Payer: MEDICARE

## 2021-05-04 ENCOUNTER — APPOINTMENT (OUTPATIENT)
Dept: ULTRASOUND IMAGING | Age: 76
DRG: 312 | End: 2021-05-04
Payer: MEDICARE

## 2021-05-04 PROBLEM — R55 SYNCOPE: Status: ACTIVE | Noted: 2021-05-04

## 2021-05-04 LAB
EKG ATRIAL RATE: 53 BPM
EKG DIAGNOSIS: NORMAL
EKG Q-T INTERVAL: 476 MS
EKG QRS DURATION: 104 MS
EKG QTC CALCULATION (BAZETT): 451 MS
EKG R AXIS: 11 DEGREES
EKG T AXIS: 59 DEGREES
EKG VENTRICULAR RATE: 54 BPM
LV EF: 55 %
LVEF MODALITY: NORMAL
TROPONIN T: <0.01 NG/ML
TROPONIN T: <0.01 NG/ML
TSH HIGH SENSITIVITY: 4.82 UIU/ML (ref 0.27–4.2)

## 2021-05-04 PROCEDURE — 84443 ASSAY THYROID STIM HORMONE: CPT

## 2021-05-04 PROCEDURE — 6360000004 HC RX CONTRAST MEDICATION

## 2021-05-04 PROCEDURE — G0378 HOSPITAL OBSERVATION PER HR: HCPCS

## 2021-05-04 PROCEDURE — 6360000002 HC RX W HCPCS: Performed by: PHYSICIAN ASSISTANT

## 2021-05-04 PROCEDURE — 6370000000 HC RX 637 (ALT 250 FOR IP): Performed by: INTERNAL MEDICINE

## 2021-05-04 PROCEDURE — 93010 ELECTROCARDIOGRAM REPORT: CPT | Performed by: INTERNAL MEDICINE

## 2021-05-04 PROCEDURE — 2580000003 HC RX 258: Performed by: PHYSICIAN ASSISTANT

## 2021-05-04 PROCEDURE — 99222 1ST HOSP IP/OBS MODERATE 55: CPT | Performed by: PSYCHIATRY & NEUROLOGY

## 2021-05-04 PROCEDURE — 96372 THER/PROPH/DIAG INJ SC/IM: CPT

## 2021-05-04 PROCEDURE — 93880 EXTRACRANIAL BILAT STUDY: CPT

## 2021-05-04 PROCEDURE — 99222 1ST HOSP IP/OBS MODERATE 55: CPT | Performed by: INTERNAL MEDICINE

## 2021-05-04 PROCEDURE — 84484 ASSAY OF TROPONIN QUANT: CPT

## 2021-05-04 PROCEDURE — 93306 TTE W/DOPPLER COMPLETE: CPT

## 2021-05-04 PROCEDURE — 2140000000 HC CCU INTERMEDIATE R&B

## 2021-05-04 PROCEDURE — 36415 COLL VENOUS BLD VENIPUNCTURE: CPT

## 2021-05-04 RX ORDER — DONEPEZIL HYDROCHLORIDE 10 MG/1
10 TABLET, FILM COATED ORAL NIGHTLY
Status: DISCONTINUED | OUTPATIENT
Start: 2021-05-04 | End: 2021-05-04

## 2021-05-04 RX ORDER — FOLIC ACID 1 MG/1
1 TABLET ORAL DAILY
Status: DISCONTINUED | OUTPATIENT
Start: 2021-05-04 | End: 2021-05-04

## 2021-05-04 RX ORDER — PROMETHAZINE HYDROCHLORIDE 25 MG/1
12.5 TABLET ORAL EVERY 6 HOURS PRN
Status: DISCONTINUED | OUTPATIENT
Start: 2021-05-04 | End: 2021-05-06 | Stop reason: HOSPADM

## 2021-05-04 RX ORDER — ACETAMINOPHEN 650 MG/1
650 SUPPOSITORY RECTAL EVERY 6 HOURS PRN
Status: DISCONTINUED | OUTPATIENT
Start: 2021-05-04 | End: 2021-05-06 | Stop reason: HOSPADM

## 2021-05-04 RX ORDER — LEVOTHYROXINE SODIUM 0.12 MG/1
250 TABLET ORAL DAILY
Status: DISCONTINUED | OUTPATIENT
Start: 2021-05-04 | End: 2021-05-06 | Stop reason: HOSPADM

## 2021-05-04 RX ORDER — DULOXETIN HYDROCHLORIDE 30 MG/1
30 CAPSULE, DELAYED RELEASE ORAL DAILY
Status: DISCONTINUED | OUTPATIENT
Start: 2021-05-04 | End: 2021-05-06 | Stop reason: HOSPADM

## 2021-05-04 RX ORDER — MAGNESIUM CHLORIDE 64 MG
1 TABLET, DELAYED RELEASE (ENTERIC COATED) ORAL 2 TIMES DAILY
Status: DISCONTINUED | OUTPATIENT
Start: 2021-05-05 | End: 2021-05-05 | Stop reason: CLARIF

## 2021-05-04 RX ORDER — ACETAMINOPHEN 325 MG/1
650 TABLET ORAL EVERY 6 HOURS PRN
Status: DISCONTINUED | OUTPATIENT
Start: 2021-05-04 | End: 2021-05-06 | Stop reason: HOSPADM

## 2021-05-04 RX ORDER — POLYETHYLENE GLYCOL 3350 17 G/17G
17 POWDER, FOR SOLUTION ORAL DAILY PRN
Status: DISCONTINUED | OUTPATIENT
Start: 2021-05-04 | End: 2021-05-06 | Stop reason: HOSPADM

## 2021-05-04 RX ORDER — SODIUM CHLORIDE 0.9 % (FLUSH) 0.9 %
5-40 SYRINGE (ML) INJECTION EVERY 12 HOURS SCHEDULED
Status: DISCONTINUED | OUTPATIENT
Start: 2021-05-04 | End: 2021-05-06 | Stop reason: HOSPADM

## 2021-05-04 RX ORDER — SODIUM CHLORIDE 0.9 % (FLUSH) 0.9 %
5-40 SYRINGE (ML) INJECTION PRN
Status: DISCONTINUED | OUTPATIENT
Start: 2021-05-04 | End: 2021-05-06 | Stop reason: HOSPADM

## 2021-05-04 RX ORDER — FOLIC ACID 1 MG/1
1 TABLET ORAL NIGHTLY
Status: DISCONTINUED | OUTPATIENT
Start: 2021-05-05 | End: 2021-05-06 | Stop reason: HOSPADM

## 2021-05-04 RX ORDER — SODIUM CHLORIDE 9 MG/ML
25 INJECTION, SOLUTION INTRAVENOUS PRN
Status: DISCONTINUED | OUTPATIENT
Start: 2021-05-04 | End: 2021-05-06 | Stop reason: HOSPADM

## 2021-05-04 RX ORDER — ONDANSETRON 2 MG/ML
4 INJECTION INTRAMUSCULAR; INTRAVENOUS EVERY 6 HOURS PRN
Status: DISCONTINUED | OUTPATIENT
Start: 2021-05-04 | End: 2021-05-06 | Stop reason: HOSPADM

## 2021-05-04 RX ORDER — LEVOCETIRIZINE DIHYDROCHLORIDE 5 MG/1
5 TABLET, FILM COATED ORAL NIGHTLY
Status: DISCONTINUED | OUTPATIENT
Start: 2021-05-05 | End: 2021-05-05 | Stop reason: CLARIF

## 2021-05-04 RX ADMIN — DONEPEZIL HYDROCHLORIDE 10 MG: 10 TABLET, FILM COATED ORAL at 00:29

## 2021-05-04 RX ADMIN — SODIUM CHLORIDE, PRESERVATIVE FREE 10 ML: 5 INJECTION INTRAVENOUS at 09:19

## 2021-05-04 RX ADMIN — PERFLUTREN 2.2 MG: 6.52 INJECTION, SUSPENSION INTRAVENOUS at 09:55

## 2021-05-04 RX ADMIN — DULOXETINE HYDROCHLORIDE 30 MG: 30 CAPSULE, DELAYED RELEASE ORAL at 09:19

## 2021-05-04 RX ADMIN — ENOXAPARIN SODIUM 40 MG: 40 INJECTION SUBCUTANEOUS at 09:18

## 2021-05-04 RX ADMIN — LEVOTHYROXINE SODIUM 250 MCG: 125 TABLET ORAL at 05:35

## 2021-05-04 RX ADMIN — FOLIC ACID 1 MG: 1 TABLET ORAL at 09:18

## 2021-05-04 ASSESSMENT — PAIN SCALES - GENERAL: PAINLEVEL_OUTOF10: 6

## 2021-05-04 ASSESSMENT — PAIN DESCRIPTION - ORIENTATION: ORIENTATION: LOWER

## 2021-05-04 ASSESSMENT — PAIN DESCRIPTION - ONSET: ONSET: ON-GOING

## 2021-05-04 ASSESSMENT — PAIN DESCRIPTION - PROGRESSION: CLINICAL_PROGRESSION: NOT CHANGED

## 2021-05-04 ASSESSMENT — PAIN DESCRIPTION - FREQUENCY: FREQUENCY: CONTINUOUS

## 2021-05-04 ASSESSMENT — PAIN DESCRIPTION - PAIN TYPE: TYPE: CHRONIC PAIN

## 2021-05-04 ASSESSMENT — PAIN DESCRIPTION - DESCRIPTORS: DESCRIPTORS: CONSTANT;ACHING;DISCOMFORT

## 2021-05-04 NOTE — CARE COORDINATION
LSW spoke with pt regarding discharge plans. Pt lives with wife and son's family. Pt stated his wife and him live in an apt in his son's home. Pt stated that he apt is handicapped accessible. Railing and grab bars in the home. Pt has a walker, cane, wc and scooter. Pt had has HC in the apt but can not remember the name of the [facility]. Pt stated he is independent of his ADLs however his wife will help if needed. Pt has a PCP and can afford his meds. Pt plans home and denies needs. Pt stated his family can pick him up at discharge.

## 2021-05-04 NOTE — PROGRESS NOTES
Patient arrived to unit per cart, patient A&o, patient oriented to room, call light. All questions answered at this time.

## 2021-05-04 NOTE — PROGRESS NOTES
Started last October  Good from January til about 10 days ago. Lethargic  Not eating  Feeling real dizzy, like looking through a fog. Get dizzy with sitting up. Passed out when going to the bathroom. Trouble waking up over the weekend. Had a little trouble breathing, laying on his back. Resting sometimes helps with him feeling wobbly. When he eats he feels nauseous. Forcing himself to eat. Replaced morphine and baclofen pump last year. Syncope/presyncope in setting of bradycardia, ?chest pain  - CT with no acute findings  - tele  - EKG with no heart blocks  - ECHO  -Neurology consult, MRI, neurochecks  -TSH added on, follow-up  -troponins negative. CT head: No acute process. CT chest: No acute process. UA unremarkable. Check carotid. Check orthostatics. May need outpatient event monitor. Cardiology following.   PT/OT.       Back pain, spinal stenosis  - pain medicine pump for 12 years with morphine  - baclofen  in pump     Depression/dementia  Cymbalta  Donepezil     Hypertension  Ramipril- held  -due to low BP.     Hypothyroid  Synthroid

## 2021-05-04 NOTE — PROGRESS NOTES
Pt on hold for mri due to pain pump. Pt has a sychromed II which is mri conditional. Dr Clarisse Cobb from [place] follows the pt pain pump. We are waiting for his office to call back with instructions related to the medication in his pain pump and consent to proceed. Mri may cause the pump to stop during scanning and could cause the motor to stall.   [employer] rep will need to check device following mri

## 2021-05-04 NOTE — CONSULTS
CARDIOLOGY CONSULT NOTE   Reason for consultation:  syncope    Referring physician:  Cheri Cuellar MD     Primary care physician: June Stanton PA-C      Dear   Thanks for the consult. History of present illness:Marbin is a 76 y. o.year old who  presents with dizziness and 2 episodes of syncope, 1st time in the while sitting on commode and 2nd time while standing in the bath room, he denied any chest pain and feels like cannot get enough air once in while ( like he wants to have deep breath), he walks with walker due to back pain, has pain pump and takes percocet for break thru pain, he also takes cymbalta. His back pain, which is located on the lower back, radiated to legs,sharp in nature, 5/10, intermittent, aggravated by standing and walking and gets better with rest    Chief Complaint   Patient presents with    Dizziness     Blood pressure, cholesterol, blood glucose and weight are well controlled. Past medical history:    has a past medical history of Arthritis, Blood circulation, collateral, Chronic back pain, Chronic venous hypertension with ulcer ([address] Difficult intubation, Edema, Enlarged prostate, Essential hypertension, H/O cardiovascular stress test, History of nuclear stress test, Hx of Venous Doppler ultrasound, HX OTHER MEDICAL, Hyperlipidemia, Lumbago, Neuromuscular disorder ([address] Neuropathy, Orthostatic hypotension, Osteoarthritis, PVD (peripheral vascular disease) ([address] Short-term memory loss, Thyroid disease, and Unstageable pressure ulcer of buttock ([address] Past surgical history:   has a past surgical history that includes joint replacement (1-); Laminotomy (4-6-2005); Adrenalectomy (1-6-1999); eye surgery (9-); eye surgery (12-1-1997); Total hip arthroplasty (Left, 2015); back surgery; Colonoscopy; Tonsillectomy (age 29); other surgical history; Upper gastrointestinal endoscopy (N/A, 11/27/2018); and Colonoscopy (N/A, 11/27/2018).   Social History:   reports that he has never smoked. He has never used smokeless tobacco. He reports that he does not drink alcohol or use drugs.   Family history:   no family history of CAD, STROKE of DM    Allergies   Allergen Reactions    Fish Allergy Nausea And Vomiting       sodium chloride flush 0.9 % injection 5-40 mL, 2 times per day  sodium chloride flush 0.9 % injection 5-40 mL, PRN  0.9 % sodium chloride infusion, PRN  enoxaparin (LOVENOX) injection 40 mg, Daily  promethazine (PHENERGAN) tablet 12.5 mg, Q6H PRN    Or  ondansetron (ZOFRAN) injection 4 mg, Q6H PRN  polyethylene glycol (GLYCOLAX) packet 17 g, Daily PRN  acetaminophen (TYLENOL) tablet 650 mg, Q6H PRN    Or  acetaminophen (TYLENOL) suppository 650 mg, Q6H PRN  donepezil (ARICEPT) tablet 10 mg, Nightly  DULoxetine (CYMBALTA) extended release capsule 30 mg, Daily  folic acid (FOLVITE) tablet 1 mg, Daily  levothyroxine (SYNTHROID) tablet 250 mcg, Daily      Current Facility-Administered Medications   Medication Dose Route Frequency Provider Last Rate Last Admin    sodium chloride flush 0.9 % injection 5-40 mL  5-40 mL Intravenous 2 times per day DANIELA Moss-C   10 mL at 05/04/21 0919    sodium chloride flush 0.9 % injection 5-40 mL  5-40 mL Intravenous PRN Tito Uribeort PA-C        0.9 % sodium chloride infusion  25 mL Intravenous PRN Ambar Araujo PA-C        enoxaparin (LOVENOX) injection 40 mg  40 mg Subcutaneous Daily DANIELA Moss-C   40 mg at 05/04/21 0918    promethazine (PHENERGAN) tablet 12.5 mg  12.5 mg Oral Q6H PRN Tito Uribeort PA-C        Or    ondansetron (ZOFRAN) injection 4 mg  4 mg Intravenous Q6H PRN Ambar Araujo PA-C        polyethylene glycol (GLYCOLAX) packet 17 g  17 g Oral Daily PRN Ambar Araujo PA-C        acetaminophen (TYLENOL) tablet 650 mg  650 mg Oral Q6H PRN Tito Uribeort PA-C        Or    acetaminophen (TYLENOL) suppository 650 mg  650 mg Rectal Q6H PRN Tito Llamas PA-C        donepezil (ARICEPT) tablet 10 mg  10 mg Oral Nightly Lorne Hall MD   10 mg at 05/04/21 0029    DULoxetine (CYMBALTA) extended release capsule 30 mg  30 mg Oral Daily Robi OH MD   30 mg at 11/81/71 7872    folic acid (FOLVITE) tablet 1 mg  1 mg Oral Daily Lorne Hall MD   1 mg at 05/04/21 1270    levothyroxine (SYNTHROID) tablet 250 mcg  250 mcg Oral Daily Robi OH MD   250 mcg at 05/04/21 0535     Review of Systems:   · Constitutional: No Fever or Weight Loss   · Eyes: No Decreased Vision  · ENT: No Headaches, Hearing Loss or Vertigo  · Cardiovascular: No chest pain, dyspnea on exertion, palpitations or loss of consciousness  · Respiratory: No cough or wheezing    · Gastrointestinal: No abdominal pain, appetite loss, blood in stools, constipation, diarrhea or heartburn  · Genitourinary: No dysuria, trouble voiding, or hematuria  · Musculoskeletal:  No gait disturbance, weakness or joint complaints  · Integumentary: No rash or pruritis  · Neurological: No TIA or stroke symptoms  · Psychiatric: No anxiety or depression  · Endocrine: No malaise, fatigue or temperature intolerance  · Hematologic/Lymphatic: No bleeding problems, blood clots or swollen lymph nodes  · Allergic/Immunologic: No nasal congestion or hives  All systems negative except as marked. ·   ·      Physical Examination:    Vitals:    05/04/21 0432   BP: 136/73   Pulse:    Resp:    Temp: 97.6 °F (36.4 °C)   SpO2:       Wt Readings from Last 3 Encounters:   05/04/21 232 lb 3.2 oz (105.3 kg)   12/03/20 238 lb (108 kg)   06/17/19 246 lb 3.2 oz (111.7 kg)     Body mass index is 31.49 kg/m². General Appearance:  No distress, conversant    Constitutional:  Well developed, Well nourished, No acute distress, Non-toxic appearance.    HENT:  Normocephalic, Atraumatic, Bilateral external ears normal, Oropharynx moist, No oral exudates, Nose normal. Neck- Normal range of motion, No tenderness, Supple, No stridor,no apical-carotid delay, no carotid bruit  Eyes:  PERRL, EOMI, Conjunctiva normal, No discharge. Respiratory:  Normal breath sounds, No respiratory distress, No wheezing, No chest tenderness. ,no use of accessory muscles, diaphragm movement is normal  Cardiovascular: (PMI) apex non displaced,no lifts no thrills, no s3,no s4, Normal heart rate, Normal rhythm, No murmurs, No rubs, No gallops. Carotid arteries pulse and amplitude are normal no bruit, no abdominal bruit noted ( normal abdominal aorta ausculation), femoral arteries pulse and amplitude are normal no bruit, pedal pulses are normal  GI:  Bowel sounds normal, Soft, No tenderness, No masses, No pulsatile masses, no hepatosplenomegally, no bruits  : External genitalia appear normal, No masses or lesions. No discharge. No CVA tenderness. Musculoskeletal:  Intact distal pulses, No edema, No tenderness, No cyanosis, No clubbing. Good range of motion in all major joints. No tenderness to palpation or major deformities noted. Back- No tenderness. Integument:  Warm, Dry, No erythema, No rash. Skin: no rash, no ulcers  Lymphatic:  No lymphadenopathy noted. Neurologic:  Alert & oriented x 3, Normal motor function, Normal sensory function, No focal deficits noted. Psychiatric:  Affect normal, Judgment normal, Mood normal.   Lab Review   Recent Labs     05/03/21  1545   WBC 5.9   HGB 13.2*   HCT 41.2*         Recent Labs     05/03/21  1545      K 4.4   CL 99   CO2 30   BUN 19   CREATININE 1.0     Recent Labs     05/03/21  1545   AST 21   ALT 11   BILITOT 0.3   ALKPHOS 90     No results for input(s): TROPONINI in the last 72 hours. No results found for: BNP  Lab Results   Component Value Date    INR 1.06 02/06/2012    PROTIME 11.6 02/06/2012         EKG:    Chest Xray:    ECHO:  Labs, echo, meds reviewed  Assessment: 76 y. o.year old with PMH of  has a past medical history of Arthritis, Blood circulation, collateral, Chronic back pain, Chronic venous hypertension with ulcer ([address] Difficult intubation, Edema, Enlarged prostate, Essential hypertension, H/O cardiovascular stress test, History of nuclear stress test, Hx of Venous Doppler ultrasound, HX OTHER MEDICAL, Hyperlipidemia, Lumbago, Neuromuscular disorder (H[facility]), Neuropathy, Orthostatic hypotension, Osteoarthritis, PVD (peripheral vascular disease) ([facility] Short-term memory loss, Thyroid disease, and Unstageable pressure ulcer of buttock ([facility] Recommendations:    1. Dizziness and syncope/; could be from orthostatic hypotension while standing, however, syncope while sitting on commode could be vasovagal, however, possibillity of sick sinu syndrome and pain pump related overdose cannot be ruled out, unforturtunately, he cannot do tilt table test due to his back issues, will see if orthostatic vitals can be recorded while in hospital, continue telemetery and may need out patient event monitor, echo ordered  2. Leg swelling and stasis dermatitis; venous doppler showed left SFV venous insufficiency, will recommend to use compressions   3. HTN: stable, continue altace  4. Hypothyroidism: continue synthroid  5. Exhaustion; r/o hypothyroidism  6. Dyslipidemia: stable  7. Health maintenance: exerise and diet  All labs, medications and tests reviewed, continue all other medications of all above medical condition listed as is.          Everardo Olivo MD, 5/4/2021 9:26 AM

## 2021-05-04 NOTE — CONSULTS
Neurology Service Consult Note  [address] [place] [address]  Patient Name: Vasile Jorge  : 1945        Subjective:   Reason for consult: \"I have been really dizzy\"  76 y. o.right -handed male with PMH listed below presenting to [address] [place] [address] with complaints of LOC, lightheadedness, dizziness and falls over the last 10 days. Patient states he is a patient of Dr. Elder Bailey. He states that he recently was at his office last month for work up of balance, neuropathy, tremor, memory and night terrors. After the visit and within the last 10 days he has been experiencing lightheadedness frequently and have suffered a near syncopal episode while standing in the bathroom without full LOC then a full episode while sitting on the toilet. He states he feels very off balance and feels very lightheaded with room spinning throughout the day. Since the last 10 days he has also been experiencing a film and blurriness over his vision. He states from time to time he is also having two images superimposed on each other vertically, he is not sure if closing his eyes helps or not. He denies any unilateral arm or leg weakness with his symptoms just generally weak. He was admitted in 2020 for similar occurrence. He was on Namenda and Aricept. The Namenda has been removed. He states he has a dull headache but no jaw claudication. Denies n/v/d. No diarrhea. He has chronic back pain and a pain pump but not acute neck or back pain that is new. No fever. Family at the bedside.        Past Medical History:   Diagnosis Date    Arthritis     Blood circulation, collateral     Chronic back pain     Chronic venous hypertension with ulcer ([address]     Difficult intubation     Edema     Enlarged prostate     Essential hypertension 2016    H/O cardiovascular stress test 2018    NM Normal study    History of nuclear stress test 2018    Normal LV function    Hx of Venous Doppler ultrasound 08/15/2018    No evidence of DVT or SVT No significant Nausea And Vomiting     Social History     Socioeconomic History    Marital status:      Spouse name: Not on file    Number of children: Not on file    Years of education: Not on file    Highest education level: Not on file   Occupational History    Not on file   Social Needs    Financial resource strain: Not on file    Food insecurity     Worry: Not on file     Inability: Not on file    Transportation needs     Medical: Not on file     Non-medical: Not on file   Tobacco Use    Smoking status: Never Smoker    Smokeless tobacco: Never Used   Substance and Sexual Activity    Alcohol use: No    Drug use: No    Sexual activity: Not on file   Lifestyle    Physical activity     Days per week: Not on file     Minutes per session: Not on file    Stress: Not on file   Relationships    Social connections     Talks on phone: Not on file     Gets together: Not on file     Attends Jewish service: Not on file     Active member of club or organization: Not on file     Attends meetings of clubs or organizations: Not on file     Relationship status: Not on file    Intimate partner violence     Fear of current or ex partner: Not on file     Emotionally abused: Not on file     Physically abused: Not on file     Forced sexual activity: Not on file   Other Topics Concern    Not on file   Social History Narrative    Not on file      Family History   Problem Relation Age of Onset    Heart Disease Mother     High Blood Pressure Mother     Heart Disease Father     High Blood Pressure Father     Early Death Brother     Cancer Brother        Review of Symptoms:    14-point system review completed. All of which are negative except as mentioned above. Physical Exam:       Gen: A&O x 4, NAD, cooperative  HEENT: NC/AT, EOMI, PERRL, mmm, neck supple, no meningeal signs;    Heart: Regular, SB  Lungs: no distress  Ext: no edema, no calf tenderness b/l  Psych: normal mood and affect  Skin: no rashes or lesions    NEUROLOGIC EXAM:    Mental Status: A&O to self, location, month and year, NAD, speech clear, language fluent, repetition and naming intact, follows commands appropriately    Cranial Nerve Exam:   CN II-XII:  PERRL, VFF, no nystagmus, no gaze paresis, sensation V1-V3 intact b/l, muscles of facial expression symmetric; hearing intact to conversational tone, palate elevates symmetrically, shoulder elevation symmetric and tongue protrudes midline with movement side to side. Motor Exam:       Strength 5/5 UE's/LE's b/l  Tone and bulk normal   No pronator drift    Deep Tendon Reflexes: 1/4 biceps, triceps, brachioradialis, patellar, and achilles b/l; flexor plantar responses b/l    Sensation: Intact light touch/pinprick/vibration UE's/LE's b/l, all decreased in chronic stocking distribution     Coordination/Cerebellum:       Tremors--RUE intention tremor       Rapidly alternating movements: no dysdiadochokinesia b/l                Heel-to-Shin: no dysmetria b/l      Finger-to-Nose: no dysmetria b/l    Gait and stance:      Gait: deferred for safety       LABS:     Recent Labs     05/03/21  1545   WBC 5.9      K 4.4   CL 99   CO2 30   BUN 19   CREATININE 1.0   GLUCOSE 95       IMAGING:      MRI Brain pending  CUS pending   ECHO pending     ASSESSMENT/PLAN:     3 76year old male with acute LOC and described lightheadedness, dizziness and falls, suspect LOC secondary to Sinus Bradycardia superimposed on Aricept use and possible other cardiac etiology. 2. Falls secondary to Sinus Bradycardia superimposed on chronic PN  3. Dizziness will rule out PCA infarction  1. Neuro Exam:  1. Chronic BLE stocking distribution sensation loss  2. Neurodiagnostics:  1. MRI brain pending  2. CTA head and neck from 12/2021 non acute  3. CUS pending  4. ECHO pending   3. Medications:  1. ASA  2. Statin   3. Stop Aricept and Namenda   4. PT/OT/ST:  1. Balance therapy  5. Follow up:  1.  Pending course of admission and MRI 2. Continued outpatient tremor work up and memory work up         Thank you for allowing us to participate in the care of your patient. If there are any questions regarding evaluation please feel free to contact us. MIGUEL Sims CNP, 5/4/2021       ------------------------------------    Attending Note:  I have rounded on this patient with Mandie Thurman CNP. I have reviewed the chart and we have discussed this case in detail. The patient was seen and examined by myself. Pertinent labs and imaging have been personally reviewed. Our findings and impressions were discussed with the patient. I concur with the Nurse Practioner's assessment and plan. Absolutely with the patient. I do see him on an outpatient basis for neuropathic pain related to idiopathic peripheral neuropathy and mild cognitive impairment. He recently was restarted on memantine at his request to see if he would better tolerate it. Unfortunately, he had side effects and this was discontinued. He presents upon admission with syncope and bradycardia. I will also discontinue his donepezil as acetylcholinesterase inhibitors do have a rare side effect of bradycardia. He has been on this for several years so it is not likely that it is everything contributing but given that he is having bradycardic issues at this time we should not maintain him on donepezil. Cardiology is also following. MRI of the brain is pending as he is also experiencing some intermittent double vision and we need to ensure he has not suffered any sort of posterior circulation or brainstem infarct that could be contributing to dizziness. We will continue to follow.     Lauren Ibarra DO 5/4/2021 10:01 PM

## 2021-05-04 NOTE — H&P
HISTORY AND PHYSICAL  (Hospitalist, Internal Medicine)  IDENTIFYING INFORMATION   PATIENT:  Benjie Stanley  MRN:  [id number]  ADMIT DATE: 5/3/2021  TIME OF EVALUATION: 5/4/2021 12:08 AM    CHIEF COMPLAINT     Syncope and chest pain  HISTORY OF PRESENT ILLNESS   Benjie Stanley is a 76 y.o. male admitted for syncope and chest pain. Complaint of dizziness, weakness and syncope that occurred recently. Had a syncopal episode in bathroom 3 days ago. He has a history of vertigo, however is episodes similar to what he had last fall. Patient came to the hospital this time for complaint of chest pain, is located in right side. Pt otherwise has no complaints of  SOB,  N/V/C/D, abdominal pain, dysuria, joint pains, rash/boils, or fevers. Also c/o vision fogging with the dizziness that started the same time, her vision changes very with blurriness to double vision, and is episodic. PMH listed below:    PAST MEDICAL, SURGICAL, FAMILY, and SOCIAL HISTORY     Past Medical History:   Diagnosis Date    Arthritis     Blood circulation, collateral     Chronic back pain     Chronic venous hypertension with ulcer ([address]     Difficult intubation     Edema     Enlarged prostate     Essential hypertension 4/19/2016    H/O cardiovascular stress test 08/06/2018    NM Normal study    History of nuclear stress test 08/06/2018    Normal LV function    Hx of Venous Doppler ultrasound 08/15/2018    No evidence of DVT or SVT No significant reflux noted in the veins of the right lower extremity.  Significant reflux noted in the Left CFV and mid FV,    HX OTHER MEDICAL     \"have trouble swallowing sometimes and they have had trouble putting the tube down my throat in the past- have a letter will bring in copy for anesthesia    Hyperlipidemia     Lumbago     Neuromuscular disorder ([address]     Neuropathy     Orthostatic hypotension     Osteoarthritis     PVD (peripheral vascular disease) ([address]     Short-term memory loss on medication    Thyroid disease     Unstageable pressure ulcer of buttock ([address]      Past Surgical History:   Procedure Laterality Date    ADRENALECTOMY  1-6-1999    BACK SURGERY      \"had back surgery total of 6 times over past 25 yrs- last one 2005- first one [address]      \"had colonoscopy 40 yrs ago\"    COLONOSCOPY N/A 11/27/2018    COLONOSCOPY POLYPECTOMY SNARE/COLD BIOPSY performed by Estelle Mccoy MD at [address]  9-    cataract removal right eye    EYE SURGERY  12-1-1997    cataract removal left eye    JOINT REPLACEMENT  1-    complete right hip     LAMINOTOMY  4-6-2005    microdiskectomy L3-L4    OTHER SURGICAL HISTORY      \"have infusion pump ( pain pump) put in 2008 and second one done 2015\"    TONSILLECTOMY  age 29   [address] Left 2015    UPPER GASTROINTESTINAL ENDOSCOPY N/A 11/27/2018    EGD BIOPSY performed by Estelle Mccoy MD at [facility] ENDOSCOPY     Family History   Problem Relation Age of Onset    Heart Disease Mother     High Blood Pressure Mother     Heart Disease Father     High Blood Pressure Father     Early Death Brother     Cancer Brother      Family Hx of HTN  Family Hx as reviewed above, otherwise non-contributory  Social History     Socioeconomic History    Marital status:      Spouse name: None    Number of children: None    Years of education: None    Highest education level: None   Occupational History    None   Social Needs    Financial resource strain: None    Food insecurity     Worry: None     Inability: None    Transportation needs     Medical: None     Non-medical: None   Tobacco Use    Smoking status: Never Smoker    Smokeless tobacco: Never Used   Substance and Sexual Activity    Alcohol use: No    Drug use: No    Sexual activity: None   Lifestyle    Physical activity     Days per week: None     Minutes per session: None    Stress: None   Relationships    Social connections     Talks on phone: None     Gets together: None     Attends Latter day service: None     Active member of club or organization: None     Attends meetings of clubs or organizations: None     Relationship status: None    Intimate partner violence     Fear of current or ex partner: None     Emotionally abused: None     Physically abused: None     Forced sexual activity: None   Other Topics Concern    None   Social History Narrative    None       MEDICATIONS   Medications Prior to Admission  Medications Prior to Admission: magnesium chloride (MAG DELAY) 64 MG TBEC extended release tablet, Take 1 tablet by mouth 2 times daily  DULoxetine (CYMBALTA) 30 MG extended release capsule, Take 1 capsule by mouth daily  UNABLE TO FIND, Infusion pump with Baclofen pump and Morphine  - cONTINUOUSLY  donepezil (ARICEPT) 10 MG tablet, Take 10 mg by mouth nightly  Compression Stockings MISC, by Does not apply route 20 - 30 mmh Wear Daily Can Take Off At Night Thigh High  oxaprozin (DAYPRO) 600 MG tablet, Take 1 tablet by mouth daily  folic acid (FOLVITE) 1 MG tablet, Take 1 tablet by mouth daily  ramipril (ALTACE) 5 MG capsule, Take 1 capsule by mouth daily  trospium (SANCTURA XR) 60 MG CP24 extended release capsule, Take 1 capsule by mouth daily  levothyroxine (SYNTHROID) 125 MCG tablet, Take 2 tablets by mouth daily  levocetirizine (XYZAL) 5 MG tablet, TAKE ONE TABLET BY MOUTH DAILY  clotrimazole-betamethasone (LOTRISONE) cream, Apply  topically every 72 hours. Apply every 3 days with dressing changes. oxyCODONE-acetaminophen (PERCOCET) 5-325 MG per tablet, Take 1 tablet by mouth every 4 hours as needed.       Current Medications  Current Facility-Administered Medications   Medication Dose Route Frequency Provider Last Rate Last Admin    sodium chloride flush 0.9 % injection 5-40 mL  5-40 mL Intravenous 2 times per day Ambar Araujo PA-C        sodium chloride flush 0.9 % injection 5-40 mL  5-40 mL Intravenous PRN Lester Alicia PA-C        0.9 % sodium chloride infusion  25 mL Intravenous PRN Lester Alicia PA-C        enoxaparin (LOVENOX) injection 40 mg  40 mg Subcutaneous Daily Ambar Araujo PA-C        promethazine (PHENERGAN) tablet 12.5 mg  12.5 mg Oral Q6H PRN Lester Alicia PA-C        Or    ondansetron (ZOFRAN) injection 4 mg  4 mg Intravenous Q6H PRN Ambar Araujo PA-C        polyethylene glycol (GLYCOLAX) packet 17 g  17 g Oral Daily PRN Ambar Araujo PA-C        acetaminophen (TYLENOL) tablet 650 mg  650 mg Oral Q6H PRN Lestre Alicia PA-C        Or    acetaminophen (TYLENOL) suppository 650 mg  650 mg Rectal Q6H PRN Lester Alicia PA-C             Allergies  Allergies   Allergen Reactions    Fish Allergy Nausea And Vomiting       REVIEW OF SYSTEMS   Within above limitations. 14 point review of systems reviewed. Pertinent positive or negative as per HPI or otherwise negative per 14 point systems review. PHYSICAL EXAM     Wt Readings from Last 3 Encounters:   05/03/21 242 lb (109.8 kg)   12/03/20 238 lb (108 kg)   06/17/19 246 lb 3.2 oz (111.7 kg)       Blood pressure (!) 110/55, pulse 58, temperature 97.6 °F (36.4 °C), temperature source Infrared, resp. rate 14, height 6' (1.829 m), weight 242 lb (109.8 kg), SpO2 96 %. General - AAO x 3  Psych - Appropriate affect/speech. No agitation  Eyes - Eye lids intact. No scleral icterus  ENT - Lips wnl. External ear clear/dry/intact. No thyromegaly on inspection  Neuro - No gross peripheral or central neuro deficits on inspection  Heart - Sinus. RRR. S1 and S2 present. No added HS/murmurs appreciated. No elevated JVD appreciated  Lung - Adequate air entry b/l, No crackles/wheezes appreciated  GI - Soft. No guarding/rigidity. No hepatosplenomegaly/ascites. BS+   - No CVA/suprapubic tenderness or palpable bladder distension  Skin - Intact. No rash/petechiae/ecchymosis. Warm extremities  MSK - Joints with normal ROM.  No joint swellings    Lines/Drains/Airways/Wounds:  [unfilled]    LABS AND IMAGING   CBC  [unfilled]    Last 3 Hemoglobin  Lab Results   Component Value Date    HGB 13.2 05/03/2021    HGB 13.5 12/03/2020    HGB 13.1 02/08/2017     Last 3 WBC/ANC  Lab Results   Component Value Date    WBC 5.9 05/03/2021    WBC 5.9 12/03/2020    WBC 7.0 02/08/2017     No components found for: GRNLOCTYABS  Last 3 Platelets  No results found for: PLATELET  Chemistry  [unfilled]  [unfilled]  No results found for: LDH  Coagulation Studies  Lab Results   Component Value Date    INR 1.06 02/06/2012     Liver Function Studies  Lab Results   Component Value Date    ALT 11 05/03/2021    AST 21 05/03/2021    ALKPHOS 90 05/03/2021       Recent Imaging        Relevant labs and imaging reviewed    ASSESSMENT AND PLAN   Jojo Amador is a 76 y.o. male p/w    Syncope/presyncope in setting of bradycardia and chest pain  - CT with no acute findings  - tele  - EKG with no heart blocks  - orthostatic BP  -Trend troponins  - cardiology consult  - ECHO  -Neurology consult, MRI, neurochecks  -TSH added on, follow-up    Back pain, spinal stenosis  - pain medicine pump for 12 years with morphine  - baclofen     Depression/dementia  Cymbalta  Donepezil    Hypertension  Ramipril- held    Hypothyroid  Synthroid    Case d/w ED provider    DVT ppx: lovenox  Code status: full    Francie's, Internal Medicine  5/4/2021 at 12:08 AM

## 2021-05-05 ENCOUNTER — APPOINTMENT (OUTPATIENT)
Dept: MRI IMAGING | Age: 76
DRG: 312 | End: 2021-05-05
Payer: MEDICARE

## 2021-05-05 LAB
ALBUMIN SERPL-MCNC: 4.3 GM/DL (ref 3.4–5)
ALP BLD-CCNC: 85 IU/L (ref 40–128)
ALT SERPL-CCNC: 11 U/L (ref 10–40)
ANION GAP SERPL CALCULATED.3IONS-SCNC: 7 MMOL/L (ref 4–16)
AST SERPL-CCNC: 19 IU/L (ref 15–37)
BILIRUB SERPL-MCNC: 0.4 MG/DL (ref 0–1)
BUN BLDV-MCNC: 13 MG/DL (ref 6–23)
CALCIUM SERPL-MCNC: 9.4 MG/DL (ref 8.3–10.6)
CHLORIDE BLD-SCNC: 97 MMOL/L (ref 99–110)
CO2: 31 MMOL/L (ref 21–32)
CREAT SERPL-MCNC: 1.1 MG/DL (ref 0.9–1.3)
ERYTHROCYTE SEDIMENTATION RATE: 15 MM/HR (ref 0–20)
FERRITIN: 151 NG/ML (ref 30–400)
FOLATE: 16.9 NG/ML (ref 3.1–17.5)
GFR AFRICAN AMERICAN: >60 ML/MIN/1.73M2
GFR NON-AFRICAN AMERICAN: >60 ML/MIN/1.73M2
GLUCOSE BLD-MCNC: 96 MG/DL (ref 70–99)
HCT VFR BLD CALC: 41.3 % (ref 42–52)
HEMOGLOBIN: 13 GM/DL (ref 13.5–18)
HIGH SENSITIVE C-REACTIVE PROTEIN: 2.4 MG/L
MCH RBC QN AUTO: 30 PG (ref 27–31)
MCHC RBC AUTO-ENTMCNC: 31.5 % (ref 32–36)
MCV RBC AUTO: 95.2 FL (ref 78–100)
PDW BLD-RTO: 12.1 % (ref 11.7–14.9)
PLATELET # BLD: 178 K/CU MM (ref 140–440)
PMV BLD AUTO: 9.3 FL (ref 7.5–11.1)
POTASSIUM SERPL-SCNC: 4.6 MMOL/L (ref 3.5–5.1)
RBC # BLD: 4.34 M/CU MM (ref 4.6–6.2)
SODIUM BLD-SCNC: 135 MMOL/L (ref 135–145)
TOTAL CK: 73 IU/L (ref 38–174)
TOTAL PROTEIN: 6.8 GM/DL (ref 6.4–8.2)
VITAMIN B-12: 333.8 PG/ML (ref 211–911)
WBC # BLD: 5.4 K/CU MM (ref 4–10.5)

## 2021-05-05 PROCEDURE — 85027 COMPLETE CBC AUTOMATED: CPT

## 2021-05-05 PROCEDURE — 82728 ASSAY OF FERRITIN: CPT

## 2021-05-05 PROCEDURE — 97166 OT EVAL MOD COMPLEX 45 MIN: CPT

## 2021-05-05 PROCEDURE — 6370000000 HC RX 637 (ALT 250 FOR IP): Performed by: NURSE PRACTITIONER

## 2021-05-05 PROCEDURE — 82607 VITAMIN B-12: CPT

## 2021-05-05 PROCEDURE — 97530 THERAPEUTIC ACTIVITIES: CPT

## 2021-05-05 PROCEDURE — 6360000002 HC RX W HCPCS: Performed by: PHYSICIAN ASSISTANT

## 2021-05-05 PROCEDURE — 80053 COMPREHEN METABOLIC PANEL: CPT

## 2021-05-05 PROCEDURE — 86141 C-REACTIVE PROTEIN HS: CPT

## 2021-05-05 PROCEDURE — G0378 HOSPITAL OBSERVATION PER HR: HCPCS

## 2021-05-05 PROCEDURE — 36415 COLL VENOUS BLD VENIPUNCTURE: CPT

## 2021-05-05 PROCEDURE — 2580000003 HC RX 258: Performed by: PHYSICIAN ASSISTANT

## 2021-05-05 PROCEDURE — 97116 GAIT TRAINING THERAPY: CPT

## 2021-05-05 PROCEDURE — 85652 RBC SED RATE AUTOMATED: CPT

## 2021-05-05 PROCEDURE — 6370000000 HC RX 637 (ALT 250 FOR IP): Performed by: INTERNAL MEDICINE

## 2021-05-05 PROCEDURE — 2140000000 HC CCU INTERMEDIATE R&B

## 2021-05-05 PROCEDURE — 94761 N-INVAS EAR/PLS OXIMETRY MLT: CPT

## 2021-05-05 PROCEDURE — 96372 THER/PROPH/DIAG INJ SC/IM: CPT

## 2021-05-05 PROCEDURE — 70551 MRI BRAIN STEM W/O DYE: CPT

## 2021-05-05 PROCEDURE — 82746 ASSAY OF FOLIC ACID SERUM: CPT

## 2021-05-05 PROCEDURE — 97162 PT EVAL MOD COMPLEX 30 MIN: CPT

## 2021-05-05 PROCEDURE — 99232 SBSQ HOSP IP/OBS MODERATE 35: CPT | Performed by: INTERNAL MEDICINE

## 2021-05-05 PROCEDURE — 82550 ASSAY OF CK (CPK): CPT

## 2021-05-05 PROCEDURE — 99233 SBSQ HOSP IP/OBS HIGH 50: CPT | Performed by: NURSE PRACTITIONER

## 2021-05-05 RX ORDER — CETIRIZINE HYDROCHLORIDE 10 MG/1
10 TABLET ORAL NIGHTLY
Status: DISCONTINUED | OUTPATIENT
Start: 2021-05-05 | End: 2021-05-06 | Stop reason: HOSPADM

## 2021-05-05 RX ORDER — IBUPROFEN 400 MG/1
400 TABLET ORAL
Status: DISCONTINUED | OUTPATIENT
Start: 2021-05-05 | End: 2021-05-06 | Stop reason: HOSPADM

## 2021-05-05 RX ORDER — MAGNESIUM OXIDE 400 MG/1
200 TABLET ORAL 2 TIMES DAILY
Status: DISCONTINUED | OUTPATIENT
Start: 2021-05-05 | End: 2021-05-06 | Stop reason: HOSPADM

## 2021-05-05 RX ADMIN — SODIUM CHLORIDE, PRESERVATIVE FREE 10 ML: 5 INJECTION INTRAVENOUS at 20:40

## 2021-05-05 RX ADMIN — MAGNESIUM OXIDE 200 MG: 400 TABLET ORAL at 00:16

## 2021-05-05 RX ADMIN — SODIUM CHLORIDE, PRESERVATIVE FREE 10 ML: 5 INJECTION INTRAVENOUS at 09:14

## 2021-05-05 RX ADMIN — CETIRIZINE HYDROCHLORIDE 10 MG: 10 TABLET, FILM COATED ORAL at 20:39

## 2021-05-05 RX ADMIN — IBUPROFEN 400 MG: 400 TABLET ORAL at 09:14

## 2021-05-05 RX ADMIN — ENOXAPARIN SODIUM 40 MG: 40 INJECTION SUBCUTANEOUS at 09:15

## 2021-05-05 RX ADMIN — MAGNESIUM OXIDE 200 MG: 400 TABLET ORAL at 20:39

## 2021-05-05 RX ADMIN — IBUPROFEN 400 MG: 400 TABLET ORAL at 16:27

## 2021-05-05 RX ADMIN — DULOXETINE HYDROCHLORIDE 30 MG: 30 CAPSULE, DELAYED RELEASE ORAL at 09:15

## 2021-05-05 RX ADMIN — IBUPROFEN 400 MG: 400 TABLET ORAL at 11:43

## 2021-05-05 RX ADMIN — MAGNESIUM OXIDE 200 MG: 400 TABLET ORAL at 09:14

## 2021-05-05 RX ADMIN — FOLIC ACID 1 MG: 1 TABLET ORAL at 20:39

## 2021-05-05 RX ADMIN — CETIRIZINE HYDROCHLORIDE 10 MG: 10 TABLET, FILM COATED ORAL at 00:16

## 2021-05-05 RX ADMIN — LEVOTHYROXINE SODIUM 250 MCG: 125 TABLET ORAL at 06:53

## 2021-05-05 ASSESSMENT — PAIN SCALES - GENERAL
PAINLEVEL_OUTOF10: 2
PAINLEVEL_OUTOF10: 4

## 2021-05-05 ASSESSMENT — PAIN DESCRIPTION - DESCRIPTORS: DESCRIPTORS: CONSTANT

## 2021-05-05 ASSESSMENT — PAIN DESCRIPTION - FREQUENCY: FREQUENCY: CONTINUOUS

## 2021-05-05 ASSESSMENT — PAIN - FUNCTIONAL ASSESSMENT: PAIN_FUNCTIONAL_ASSESSMENT: ACTIVITIES ARE NOT PREVENTED

## 2021-05-05 ASSESSMENT — PAIN DESCRIPTION - LOCATION
LOCATION: BACK
LOCATION: BACK

## 2021-05-05 ASSESSMENT — PAIN DESCRIPTION - PAIN TYPE: TYPE: CHRONIC PAIN

## 2021-05-05 ASSESSMENT — PAIN DESCRIPTION - ONSET: ONSET: ON-GOING

## 2021-05-05 NOTE — PROGRESS NOTES
Neurology Service Progress Note   [facility]  Patient Name: Owen Ortega  : 1945        Subjective:   Reason for consult: \"I have been really dizzy\"      Patient seen and examined  Patient in bathroom when I rounded, did see him stand and walk  Patient was awaiting MRI during my exam  Denies CP and SOB  Still complained of visual field disturbance and dizziness    After rounding patient did obtain MRI, see results below       Past Medical History:   Diagnosis Date    Arthritis     Blood circulation, collateral     Chronic back pain     Chronic venous hypertension with ulcer ([address]     Difficult intubation     Edema     Enlarged prostate     Essential hypertension 2016    H/O cardiovascular stress test 2018    NM Normal study    History of nuclear stress test 2018    Normal LV function    Hx of Venous Doppler ultrasound 08/15/2018    No evidence of DVT or SVT No significant reflux noted in the veins of the right lower extremity.  Significant reflux noted in the Left CFV and mid FV,    HX OTHER MEDICAL     \"have trouble swallowing sometimes and they have had trouble putting the tube down my throat in the past- have a letter will bring in copy for anesthesia    Hyperlipidemia     Lumbago     Neuromuscular disorder ([address]     Neuropathy     Orthostatic hypotension     Osteoarthritis     PVD (peripheral vascular disease) ([address]     Short-term memory loss     on medication    Thyroid disease     Unstageable pressure ulcer of buttock ([address]     :   Past Surgical History:   Procedure Laterality Date    ADRENALECTOMY  1999    BACK SURGERY      \"had back surgery total of 6 times over past 25 yrs- last one - first one [address]      \"had colonoscopy 40 yrs ago\"    COLONOSCOPY N/A 2018    COLONOSCOPY POLYPECTOMY SNARE/COLD BIOPSY performed by Will Wilson MD at [facility].  2003    cataract removal right eye    EYE SURGERY  12/01/1997    cataract removal left eye    JOINT REPLACEMENT  01/25/2012    complete right hip     LAMINOTOMY  04/06/2005    microdiskectomy L3-L4    OTHER SURGICAL HISTORY  06/20/2019    [facility] (prev pump removed 2006, 20012)    TONSILLECTOMY  age 29   [address] Left 2015    UPPER GASTROINTESTINAL ENDOSCOPY N/A 11/27/2018    EGD BIOPSY performed by Estelle Mccoy MD at [facility] ENDOSCOPY     Medications:  Scheduled Meds:   cetirizine  10 mg Oral Nightly    magnesium oxide  200 mg Oral BID    ibuprofen  400 mg Oral TID WC    sodium chloride flush  5-40 mL Intravenous 2 times per day    enoxaparin  40 mg Subcutaneous Daily    DULoxetine  30 mg Oral Daily    levothyroxine  250 mcg Oral Daily    folic acid  1 mg Oral Nightly     Continuous Infusions:   sodium chloride       PRN Meds:.sodium chloride flush, sodium chloride, promethazine **OR** ondansetron, polyethylene glycol, acetaminophen **OR** acetaminophen    Allergies   Allergen Reactions    Fish Allergy Nausea And Vomiting     Social History     Socioeconomic History    Marital status:      Spouse name: Not on file    Number of children: Not on file    Years of education: Not on file    Highest education level: Not on file   Occupational History    Not on file   Social Needs    Financial resource strain: Not on file    Food insecurity     Worry: Not on file     Inability: Not on file    Transportation needs     Medical: Not on file     Non-medical: Not on file   Tobacco Use    Smoking status: Never Smoker    Smokeless tobacco: Never Used   Substance and Sexual Activity    Alcohol use: No    Drug use: No    Sexual activity: Not on file   Lifestyle    Physical activity     Days per week: Not on file     Minutes per session: Not on file    Stress: Not on file   Relationships    Social connections     Talks on phone: Not on file     Gets together: Not on file     Attends Judaism service: Not on file     Active member of club or organization: Not on file     Attends meetings of clubs or organizations: Not on file     Relationship status: Not on file    Intimate partner violence     Fear of current or ex partner: Not on file     Emotionally abused: Not on file     Physically abused: Not on file     Forced sexual activity: Not on file   Other Topics Concern    Not on file   Social History Narrative    Not on file      Family History   Problem Relation Age of Onset    Heart Disease Mother     High Blood Pressure Mother     Heart Disease Father     High Blood Pressure Father     Early Death Brother     Cancer Brother        Review of Symptoms:    14-point system review completed. All of which are negative except as mentioned above. Physical Exam:       Gen: A&O x 4, NAD, cooperative  HEENT: NC/AT, EOMI, PERRL, mmm, neck supple, no meningeal signs; Heart: Regular, SB  Lungs: no distress  Ext: no edema, no calf tenderness b/l  Psych: normal mood and affect  Skin: no rashes or lesions    NEUROLOGIC EXAM:    Mental Status: A&O to self, location, month and year, NAD, speech clear, language fluent, repetition and naming intact, follows commands appropriately    Cranial Nerve Exam:   CN II-XII:  PERRL, VFF, no nystagmus, no gaze paresis, sensation V1-V3 intact b/l, muscles of facial expression symmetric; hearing intact to conversational tone, palate elevates symmetrically, shoulder elevation symmetric and tongue protrudes midline with movement side to side.     Motor Exam:       Strength 5/5 UE's/LE's b/l  Tone and bulk normal   No pronator drift    Deep Tendon Reflexes: 1/4 biceps, triceps, brachioradialis, patellar, and achilles b/l; flexor plantar responses b/l    Sensation: Intact light touch/pinprick/vibration UE's/LE's b/l, all decreased in chronic stocking distribution     Coordination/Cerebellum:       Tremors--RUE intention tremor       Rapidly alternating movements: no dysdiadochokinesia b/l                Heel-to-Shin: no dysmetria b/l      Finger-to-Nose: no dysmetria b/l    Gait and stance:      Gait: walker, wide based gait       LABS:     Recent Labs     05/03/21  1545 05/05/21  0328   WBC 5.9 5.4    135   K 4.4 4.6   CL 99 97*   CO2 30 31   BUN 19 13   CREATININE 1.0 1.1   GLUCOSE 95 96   ESR  --  15       IMAGING:      MRI Brain:  1. No acute intracranial abnormality. 2. Mild chronic white matter microvascular ischemic changes with chronic   lacunar infarct in the left basal ganglia.           CUS 0-50% b/l    ECHO:  Summary   Very poor quality echocardiogram due to body habitus and lung artifact. Definity contrast utilized. Left ventricular systolic function is normal.   Ejection fraction is visually estimated at 55%. Mild left ventricular hypertrophy. Moderately dilated left atrium. No evidence of any pericardial effusion. ASSESSMENT/PLAN:     3 76year old male with acute LOC and described lightheadedness, dizziness and falls, suspect LOC secondary to Sinus Bradycardia superimposed on Aricept use and possible other cardiac etiology. 2. Falls secondary to Sinus Bradycardia superimposed on chronic PN  3. No PCA infarction identified however suspect reactivation of previous infarction superimposed on acute SB.   1. Neuro Exam:  1. Chronic BLE stocking distribution sensation loss  2. Neurodiagnostics:  1. MRI brain as above   2. CTA head and neck from 12/2021 non acute  3. CUS as above   4. ECHO as above   3. Medications:  1. ASA  2. Statin   3. Stop Aricept and Namenda   4. PT/OT/ST:  1. Balance therapy  5. Follow up:  1. Therapy? 2. Continued work up in our office for tremor and memory will take place. 3. Outpatient follow up with cardiology         Thank you for allowing us to participate in the care of your patient. If there are any questions regarding evaluation please feel free to contact us.      MIGUEL Wang - MALICK, 5/5/2021 ------------------------------------    Attending Note:  I have rounded on this patient with Jm Castillo CNP. I have reviewed the chart and we have discussed this case in detail. The patient was seen and examined by myself. Pertinent labs and imaging have been personally reviewed. Our findings and impressions were discussed with the patient. I concur with the Nurse Practioner's assessment and plan. Absolutely with the patient. I do see him on an outpatient basis for neuropathic pain related to idiopathic peripheral neuropathy and mild cognitive impairment. He recently was restarted on memantine at his request to see if he would better tolerate it. Unfortunately, he had side effects and this was discontinued. He presents upon admission with syncope and bradycardia. I will also discontinue his donepezil as acetylcholinesterase inhibitors do have a rare side effect of bradycardia. He has been on this for several years so it is not likely that it is everything contributing but given that he is having bradycardic issues at this time we should not maintain him on donepezil. Cardiology is also following. MRI of the brain is pending as he is also experiencing some intermittent double vision and we need to ensure he has not suffered any sort of posterior circulation or brainstem infarct that could be contributing to dizziness. We will continue to follow.     MIGUEL Finley CNP 5/5/2021 6:24 PM

## 2021-05-05 NOTE — CONSULTS
[address], 1945, 3108/3108-A, 5/5/2021    Discharge Recommendation: Home with initial 24 hour SUP/PRN assist. Continue outpatient therapy for R shoulder. History:  [place]:  The primary encounter diagnosis was Syncope and collapse. Diagnoses of Lightheadedness, Generalized weakness, and Bradycardia were also pertinent to this visit. Subjective:  Patient states: Agreeable to therapy. Pain: Pt reports 5/10 low back. Communication with other providers: PT, RN, LSW  Restrictions: General Precautions, Fall Risk, R shoulder replacement 3/1/2021. Pt has been engaging in therapy where he has been working on ROM and 3lb weights. Home Setup/Prior level of function:  Social/Functional History  Lives With: Spouse(Apartment attached to daughter's house)  Type of Home: Apartment  Home Layout: One level  Home Access: Level entry  Bathroom Shower/Tub: Walk-in shower, Shower chair with back  [employer]: Grab bars in shower, Grab bars around toilet, Toilet raiser, Hand-held shower(Pt needs assist reaching hand held shower head, however he is able to utilize without assist.)  Home Equipment: Rolling walker, Cane, Sock aid, Wheelchair-electric, Hospital bed(Trapeze bar to assist with bed mobility. Pt typically ambulates with AD (cane). )  Receives Help From: Family  ADL Assistance: Independent  Homemaking Assistance: Independent  Homemaking Responsibilities: Yes(Wife assists with most IADLs. )  Ambulation Assistance: Independent  Transfer Assistance: Independent  Active : Yes  Mode of Transportation: Car    Examination:  · Observation: Supine in bed upon arrival  · Vision:Pt reports slightly blurry vision. · [facility]: [facility]  · Vitals: Stable vitals throughout session    Body Systems and functions:  · ROM: WFL   · Strength:   · RUE not formally tested d/t recent shoulder sx.   · LUE WFL  · Sensation: WFL  · Tone: Normal  · Coordination: WFL  · Perception: WNL    Activities of Daily Living (ADLs):  · Feeding: Joselin  setup and increased time. · Grooming: SUP in seated with setup, increased time, VC.   · UB bathing: SBA intermittent assist d/t recent shoulder surgery. Recommend pt complete in seated, setup, increased time, VC.   · LB bathing: SUP recommend pt complete in seated with long handled sponge and use of removable shower head. · UB dressing: Camilo intermittent assist d/t recent shoulder surgery. · LB dressing: Camilo pt required assist to aguilar shoes in seated this date, however at baseline pt utilizes sock aid, shoe horn, and reacher to assist in dressing. · Toileting: CGA anticipate pt could complete with CGA for toilet transfers and while engaging in LB clothing manipulation and jossue care. At home pt has raised toilet and grab bars. Cognitive and Psychosocial Functioning:  · Overall cognitive status: Oriented to time, date, person, place, city  · Affect: Normal     Balance:   · Sitting: Supervision (pt sat EOB demo good dynamic sitting balance). · Standing: CGA (pt stood with RW. Demo fair+ dynamic standing balance with forward flexed posture). Functional Mobility:   · Bed Mobility: SBA (pt performed supine to seated bed mobility with HOB slightly elevated, increased time, VC. · Transfers:   · CGA  (pt performed STS from EOB with RW. VC throughout for sequencing and increased time). · Camilo (stand to sit to chair and bed performed with VC throughout for sequencing and controlled descend). · Ambulation: SBA-CGA for safety (pt ambulated approx 100ft with RW. Demo fair pace throughout and 0 LOB. Demo forward flexed posture and required assist for ). AM-PAC 6 click short form for inpatient daily activity:   How much help from another person does the patient currently need. .. Unable  Dep A Lot  Max A A Lot   Mod A A Little  Min A A Little   CGA  SBA None   Mod I  Indep  Sup   1.   Putting on and

## 2021-05-05 NOTE — PROGRESS NOTES
Today's plan: CONTINUE PRESENT CARE, MRI of brain      Admit Date:  5/3/2021    Subjective:      Chief complaints on admission  Chief Complaint   Patient presents with    Dizziness         History of present illness:Marbin is a 76 y. o.year old who  presents with had concerns including Dizziness. Past medical history:    has a past medical history of Arthritis, Blood circulation, collateral, Chronic back pain, Chronic venous hypertension with ulcer ([address] Difficult intubation, Edema, Enlarged prostate, Essential hypertension, H/O cardiovascular stress test, History of nuclear stress test, Hx of Venous Doppler ultrasound, HX OTHER MEDICAL, Hyperlipidemia, Lumbago, Neuromuscular disorder ([address] Neuropathy, Orthostatic hypotension, Osteoarthritis, PVD (peripheral vascular disease) ([address] Short-term memory loss, Thyroid disease, and Unstageable pressure ulcer of buttock ([address] Past surgical history:   has a past surgical history that includes joint replacement (01/25/2012); Laminotomy (04/06/2005); Adrenalectomy (01/06/1999); eye surgery (09/12/2003); eye surgery (12/01/1997); Total hip arthroplasty (Left, 2015); back surgery; Colonoscopy; Tonsillectomy (age 29); other surgical history (06/20/2019); Upper gastrointestinal endoscopy (N/A, 11/27/2018); and Colonoscopy (N/A, 11/27/2018). Social History:   reports that he has never smoked. He has never used smokeless tobacco. He reports that he does not drink alcohol or use drugs. Family history:  family history includes Cancer in his brother; Early Death in his brother; Heart Disease in his father and mother; High Blood Pressure in his father and mother.     Allergies   Allergen Reactions    Fish Allergy Nausea And Vomiting         Objective:   /69   Pulse 58   Temp 97.7 °F (36.5 °C) (Oral)   Resp 12   Ht 6' (1.829 m)   Wt 232 lb 3.2 oz (105.3 kg)   SpO2 96%   BMI 31.49 kg/m²   No intake or output data in the 24 hours ending 05/05/21 1516        Physical Exam:  Constitutional:  Well developed, Well nourished, No acute distress, Non-toxic appearance. HENT:  Normocephalic, Atraumatic, Bilateral external ears normal, Oropharynx moist, No oral exudates, Nose normal. Neck- Normal range of motion, No tenderness, Supple, No stridor. Eyes:  PERRL, EOMI, Conjunctiva normal, No discharge. Respiratory:  Normal breath sounds, No respiratory distress, No wheezing, No chest tenderness. ,no use of accessory muscles, diaphragm movement is normal  Cardiovascular: (PMI) apex non displaced,no lifts no thrills, no s3,no s4, Normal heart rate, Normal rhythm, No murmurs, No rubs, No gallops. Carotid arteries pulse and amplitude are normal no bruit, no abdominal bruit noted ( normal abdominal aorta ausculation), femoral arteries pulse and amplitude are normal no bruit, pedal pulses are normal  GI:  Bowel sounds normal, Soft, No tenderness, No masses, No pulsatile masses. : External genitalia appear normal, No masses or lesions. No discharge. No CVA tenderness. Musculoskeletal:  Intact distal pulses, No edema, No tenderness, No cyanosis, No clubbing. Good range of motion in all major joints. No tenderness to palpation or major deformities noted. Back- No tenderness. Integument:  Warm, Dry, No erythema, No rash. Lymphatic:  No lymphadenopathy noted. Neurologic:  Alert & oriented x 3, Normal motor function, Normal sensory function, No focal deficits noted.    Psychiatric:  Affect normal, Judgment normal, Mood normal.     Medications:    cetirizine  10 mg Oral Nightly    magnesium oxide  200 mg Oral BID    ibuprofen  400 mg Oral TID     sodium chloride flush  5-40 mL Intravenous 2 times per day    enoxaparin  40 mg Subcutaneous Daily    DULoxetine  30 mg Oral Daily    levothyroxine  250 mcg Oral Daily    folic acid  1 mg Oral Nightly      sodium chloride       sodium chloride flush, sodium chloride, promethazine **OR** ondansetron, polyethylene glycol, acetaminophen **OR** acetaminophen    Lab Data:  CBC:   Recent Labs     05/03/21  1545 05/05/21  0328   WBC 5.9 5.4   HGB 13.2* 13.0*   HCT 41.2* 41.3*   MCV 95.8 95.2    178     BMP:   Recent Labs     05/03/21  1545 05/05/21  0328    135   K 4.4 4.6   CL 99 97*   CO2 30 31   BUN 19 13   CREATININE 1.0 1.1     LIVER PROFILE:   Recent Labs     05/03/21  1545 05/05/21  0328   AST 21 19   ALT 11 11   BILITOT 0.3 0.4   ALKPHOS 90 85     PT/INR: No results for input(s): PROTIME, INR in the last 72 hours. APTT: No results for input(s): APTT in the last 72 hours. BNP:  No results for input(s): BNP in the last 72 hours. TROPONIN: @TROPONINI:3@      Assessment:  76 y. o.year old who is admitted for          Plan:  1. Dizziness and syncope/; could be from orthostatic hypotension while standing, however, syncope while sitting on commode could be vasovagal, however, possibillity of sick sinu syndrome and pain pump related overdose cannot be ruled out, unforturtunately, he cannot do tilt table test due to his back issues, will see if orthostatic vitals can be recorded while in hospital, continue telemetery and may need out patient event monitor, echo ordered  2. Leg swelling and stasis dermatitis; venous doppler showed left SFV venous insufficiency, will recommend to use compressions   3. HTN: stable, continue altace  4. Hypothyroidism: continue synthroid  5. Exhaustion; r/o hypothyroidism  6. Dyslipidemia: stable  All labs, medications and tests reviewed, continue all other medications of all above medical condition listed as is.       Geraldo Anne MD 5/5/2021 3:16 PM

## 2021-05-05 NOTE — PROGRESS NOTES
utilize without assist.)  Home Equipment: Rolling walker, Cane, Sock aid, Wheelchair-electric, Hospital bed, 4 wheeled walker(Trapeze bar to assist with bed mobility. Pt typically ambulates with AD (cane). )  Receives Help From: Family  ADL Assistance: Independent  Homemaking Assistance: Independent  Homemaking Responsibilities: Yes(Wife assists with most IADLs. )  Ambulation Assistance: Independent  Transfer Assistance: Independent  Active : Yes  Mode of Transportation: Car    Examination of body systems (includes body structures/functions, activity/participation limitations):  · Observation: Supine in bed upon arrival. Wife visiting. Cooperative with therapy   · Vision:  Reports blurry vision though able to see accurately ~1ft in front of him. · Hearing:  [employer]   · Cardiopulmonary:  /59 sitting EOB    Musculoskeletal  · ROM R/L:  WFL BLEs  · Strength R/L:  BLEs grossly 4+/5 though reports chronic left knee problems that limit him at times   · Neuro:  Numbness/tingling to bilat feet     Mobility/treatment:   · Rolling L/R: NT    · Supine to sit:  SBA with inc effort and time. HOB elevated ~45 deg with use of bed rail using LUE. · Transfers:   · Sit to stand: CGA from EOB (2x). VCs for sequencing UEs from bed to RW vs pulling from AD to stand   · Stand to sit: CGA to [employer] for small amount of steadying due to lack of eccentric control sitting to recliner and EOB  · Step pivot: CGA for safety with RW, Cues and minimal assist for sequencing full pivot turn with AD (2x)  · Sitting balance:  SBA at EOB static and light dynamic,no UE support   · Standing balance:  CGA for safety at RW. Very fwd flexed posture with hx of chronic back pain. · Gait: ~100ft with RW CGA for safety. Very fwd flexed posture with slower jay, fatigue towards end of distance.  No major LOB noted though did provide frequent cues for body positioning within AFUA of walker for better support   · Educated pt on POC, role of PT, DME use, discharge. Cues provided to inc safety and indep with mobility     [facility] 6 Clicks Inpatient Mobility:  AM-PAC Inpatient Mobility Raw Score : 18    Safety: patient left in chair with alarm, call light within reach,  gait belt used. Assessment:  Pt is a 76year old male admitted with weakness, dec energy, lightheadedness, and unsteadiness. Had a right total shoulder replacement early March in which he is participating in outpatient therapy for. Recommend home with 24/7 supervision/PRN assist and continued outpatient PT once medically stable. Pt would benefit from continued therapy for shoulder recovery after surgery as well as balance/endurance training. Complexity: Moderate  Prognosis: Good, no significant barriers to participation at this time.   Plan Times per week: 2+/week, 1 week   Discharge Recommendations: 24 hour supervision or assist, Outpatient PT  Equipment: no needs     Goals:  Short term goals  Time Frame for Short term goals: 1 week  Short term goal 1: Pt will perform sit><supine Dennise  Short term goal 2: Pt will transfer between surfaces SBA  Short term goal 3: Pt will ambulate 100ft with RW SBA       Treatment plan:  Bed mobility, transfers, balance, gait, TA, TX     Recommendations for NURSING mobility: ambulate with RW to the bathroom     Time:   Time in: 1100  Time out: 1125  Timed treatment minutes: 10  Total time: 25    Electronically signed by:    Brenda Humphreys [id number]  5/5/2021, 1:28 PM

## 2021-05-05 NOTE — FLOWSHEET NOTE
05/05/21 1730   Vital Signs   Orthostatic B/P and Pulse?  Yes   Blood Pressure Lying 135/76   Pulse Lying 70 PER MINUTE   Blood Pressure Sitting 110/63   Pulse Sitting 75 PER MINUTE   Blood Pressure Standing 99/62   Pulse Standing 68 PER MINUTE

## 2021-05-05 NOTE — PROGRESS NOTES
[facility] PROGRESS NOTE  Date: 5/5/2021   Name: John Nixon   MRN: [id number]   YOB: 1945  Chief Complaint   Patient presents with    Dizziness        Subjective/Interval Hx:     Still having dizziness   Feeling weaker this morning   Ate a little yesterday but not much  Family also feels that he is more drowsy today than even 2 days ago    ROS: negative unless mentioned above  Objective:   Physical Exam:   /65   Pulse (!) 42   Temp 97.8 °F (36.6 °C) (Oral)   Resp 13   Ht 6' (1.829 m)   Wt 232 lb 3.2 oz (105.3 kg)   SpO2 94%   BMI 31.49 kg/m²   CONSTITUTIONAL:  No acute distress  EYES:  No discharge  HENT:  NCAT  LUNGS:  cta b/l bs+  CARDIOVASCULAR:  s1s2 rrr  ABDOMEN:  Soft nt nd  MUSCULOSKELETAL/Extremities:  Trace edema in LE  NEUROLOGIC:  No gross deficits, aao  SKIN:  No gross lesions    Assessment/Plan:     1. Syncope/presyncope in setting of bradycardia, ?chest pain  - tele  - EKG with no heart blocks  - MRI, neurochecks  -troponins negative. CT head: No acute process. CT chest: No acute process. UA unremarkable. Check orthostatics. May need outpatient event monitor. Cardiology following. PT/OT.  -CK 73. CRP 2.4. TSH 4.82. Ferritin 151. Folate 16. B12 333. UA: Unremarkable. Carotid duplex: Unremarkable. Echo: EF 55%. Awaiting [employer] rep due to pain pump in order to perform MRI. Neurology following. Telemetry tech states that heart rate went into the 30s last night. Discussed with cardiology who will take a look. 2. Back pain, spinal stenosis  - pain medicine pump for 12 years with morphine  - baclofen  in pump  -pain pump changed last year, dose is still the same as per family. 3. Depression/dementia  Cymbalta  -Donepezil stopped due to bradycardia.   4. Hypertension  Ramipril- held  -due to low BP.  5. Hypothyroid  Synthroid    DVT Prophylaxis: lovenox  Diet: DIET GENERAL;  Home  Code Status: Full Code      Dispo:  -Awaiting completion of cardiac and neuro workup. Deyanira Norwood MD  5/5/2021    Meds:   Meds:    cetirizine  10 mg Oral Nightly    magnesium oxide  200 mg Oral BID    ibuprofen  400 mg Oral TID WC    sodium chloride flush  5-40 mL Intravenous 2 times per day    enoxaparin  40 mg Subcutaneous Daily    DULoxetine  30 mg Oral Daily    levothyroxine  250 mcg Oral Daily    folic acid  1 mg Oral Nightly      Infusions:    sodium chloride       PRN Meds: sodium chloride flush, 5-40 mL, PRN  sodium chloride, 25 mL, PRN  promethazine, 12.5 mg, Q6H PRN    Or  ondansetron, 4 mg, Q6H PRN  polyethylene glycol, 17 g, Daily PRN  acetaminophen, 650 mg, Q6H PRN    Or  acetaminophen, 650 mg, Q6H PRN        Data/Labs:     Recent Labs     05/03/21  1545 05/05/21  0328   WBC 5.9 5.4   HGB 13.2* 13.0*   HCT 41.2* 41.3*    178      Recent Labs     05/03/21  1545 05/05/21  0328    135   K 4.4 4.6   CL 99 97*   CO2 30 31   BUN 19 13   CREATININE 1.0 1.1     Recent Labs     05/03/21  1545 05/05/21  0328   AST 21 19   ALT 11 11   BILITOT 0.3 0.4   ALKPHOS 90 85     No results for input(s): INR in the last 72 hours.   Recent Labs     05/03/21  1545 05/04/21  0039 05/04/21  1146 05/05/21  0328   CKTOTAL  --   --   --  73   TROPONINT <0.010 <0.010 <0.010  --      HgBA1c:   Lab Results   Component Value Date    LABA1C 5.3 09/04/2015     CALCIUM:  9.4/31 (05/05 0328)    I/O last 3 completed shifts:  In: -   Out: 350 [Urine:350]    Intake/Output Summary (Last 24 hours) at 5/5/2021 0740  Last data filed at 5/4/2021 1241  Gross per 24 hour   Intake --   Output 350 ml   Net -350 ml

## 2021-05-06 VITALS
OXYGEN SATURATION: 99 % | BODY MASS INDEX: 32.15 KG/M2 | TEMPERATURE: 98.3 F | SYSTOLIC BLOOD PRESSURE: 133 MMHG | RESPIRATION RATE: 13 BRPM | DIASTOLIC BLOOD PRESSURE: 68 MMHG | HEIGHT: 72 IN | WEIGHT: 237.4 LBS | HEART RATE: 54 BPM

## 2021-05-06 PROCEDURE — 6370000000 HC RX 637 (ALT 250 FOR IP): Performed by: NURSE PRACTITIONER

## 2021-05-06 PROCEDURE — 94761 N-INVAS EAR/PLS OXIMETRY MLT: CPT

## 2021-05-06 PROCEDURE — 6370000000 HC RX 637 (ALT 250 FOR IP): Performed by: INTERNAL MEDICINE

## 2021-05-06 PROCEDURE — G0378 HOSPITAL OBSERVATION PER HR: HCPCS

## 2021-05-06 PROCEDURE — 99223 1ST HOSP IP/OBS HIGH 75: CPT | Performed by: INTERNAL MEDICINE

## 2021-05-06 PROCEDURE — 99233 SBSQ HOSP IP/OBS HIGH 50: CPT | Performed by: INTERNAL MEDICINE

## 2021-05-06 PROCEDURE — 96372 THER/PROPH/DIAG INJ SC/IM: CPT

## 2021-05-06 PROCEDURE — 2580000003 HC RX 258: Performed by: PHYSICIAN ASSISTANT

## 2021-05-06 PROCEDURE — 6360000002 HC RX W HCPCS: Performed by: PHYSICIAN ASSISTANT

## 2021-05-06 RX ORDER — ASPIRIN 81 MG/1
81 TABLET, CHEWABLE ORAL DAILY
Qty: 30 TABLET | Refills: 0 | Status: SHIPPED | OUTPATIENT
Start: 2021-05-06

## 2021-05-06 RX ADMIN — LEVOTHYROXINE SODIUM 250 MCG: 125 TABLET ORAL at 06:28

## 2021-05-06 RX ADMIN — MAGNESIUM OXIDE 200 MG: 400 TABLET ORAL at 08:18

## 2021-05-06 RX ADMIN — DULOXETINE HYDROCHLORIDE 30 MG: 30 CAPSULE, DELAYED RELEASE ORAL at 08:18

## 2021-05-06 RX ADMIN — IBUPROFEN 400 MG: 400 TABLET ORAL at 08:18

## 2021-05-06 RX ADMIN — ENOXAPARIN SODIUM 40 MG: 40 INJECTION SUBCUTANEOUS at 08:18

## 2021-05-06 RX ADMIN — IBUPROFEN 400 MG: 400 TABLET ORAL at 11:55

## 2021-05-06 RX ADMIN — SODIUM CHLORIDE, PRESERVATIVE FREE 10 ML: 5 INJECTION INTRAVENOUS at 08:19

## 2021-05-06 ASSESSMENT — PAIN SCALES - GENERAL
PAINLEVEL_OUTOF10: 2
PAINLEVEL_OUTOF10: 4
PAINLEVEL_OUTOF10: 4
PAINLEVEL_OUTOF10: 0

## 2021-05-06 ASSESSMENT — PAIN - FUNCTIONAL ASSESSMENT
PAIN_FUNCTIONAL_ASSESSMENT: PREVENTS OR INTERFERES SOME ACTIVE ACTIVITIES AND ADLS
PAIN_FUNCTIONAL_ASSESSMENT: PREVENTS OR INTERFERES SOME ACTIVE ACTIVITIES AND ADLS

## 2021-05-06 ASSESSMENT — PAIN DESCRIPTION - ORIENTATION
ORIENTATION: LOWER
ORIENTATION: LOWER

## 2021-05-06 ASSESSMENT — PAIN DESCRIPTION - LOCATION
LOCATION: BACK
LOCATION: BACK

## 2021-05-06 ASSESSMENT — PAIN DESCRIPTION - PAIN TYPE
TYPE: CHRONIC PAIN
TYPE: CHRONIC PAIN

## 2021-05-06 ASSESSMENT — PAIN DESCRIPTION - PROGRESSION: CLINICAL_PROGRESSION: NOT CHANGED

## 2021-05-06 NOTE — DISCHARGE INSTR - COC
Continuity of Care Form    Patient Name: Ximena Rojas   :  1945  MRN:  [id number]    Admit date:  5/3/2021  Discharge date:  ***    Code Status Order: Full Code   Advance Directives:      Admitting Physician:  Marilyn Quintana MD  PCP: Kathy Still PA-C    Discharging Nurse: [facility] Unit/Room#: 56/12-A  Discharging Unit Phone Number: ***    Emergency Contact:   Extended Emergency Contact Information  Primary Emergency Contact: MonvitaYaz  Address: [address] Phone: [phone]  Relation: Spouse    Past Surgical History:  Past Surgical History:   Procedure Laterality Date    ADRENALECTOMY  1999    BACK SURGERY      \"had back surgery total of 6 times over past 25 yrs- last one - first one was in      COLONOSCOPY      \"had colonoscopy 40 yrs ago\"    COLONOSCOPY N/A 2018    COLONOSCOPY POLYPECTOMY SNARE/COLD BIOPSY performed by Stephan Winters MD at [address]  2003    cataract removal right eye    EYE SURGERY  1997    cataract removal left eye    JOINT REPLACEMENT  2012    complete right hip     LAMINOTOMY  2005    microdiskectomy L3-L4    OTHER SURGICAL HISTORY  2019    SYNCHROMED II 1566-75 MRI CONDITONAL (prev pump removed , )    TONSILLECTOMY  age 29    TOTAL HIP ARTHROPLASTY Left 2015    UPPER GASTROINTESTINAL ENDOSCOPY N/A 2018    EGD BIOPSY performed by Stephan Winters MD at [facility] ENDOSCOPY       Immunization History:   Immunization History   Administered Date(s) Administered    Td, unspecified formulation 2017       Active Problems:  Patient Active Problem List   Diagnosis Code    Unstageable pressure ulcer of buttock ([facility] L89.300    Pressure ulcer of back L89.109    Chronic venous hypertension with ulcer ([facility] I87.319, L97.909    Essential hypertension I10    Mixed incontinence N39.46    Polyuria R35.8 {GREATER/LESS:763924875} 30 days.      Update Admission H&P: {CHP DME Changes in HandP:[id number]:::0}    PHYSICIAN SIGNATURE:  {Esignature:[id number]:::0}

## 2021-05-06 NOTE — PROGRESS NOTES
Today's plan: , MRI of brain negative for acute CVA, has orthostatic vitals, however, appears to have Mobitz type2 one time on tele, EP consult called      Admit Date:  5/3/2021    Subjective:ok      Chief complaints on admission  Chief Complaint   Patient presents with    Dizziness         History of present illness:Marbin is a 76 y. o.year old who  presents with had concerns including Dizziness. Past medical history:    has a past medical history of Arthritis, Blood circulation, collateral, Chronic back pain, Chronic venous hypertension with ulcer ([address] Difficult intubation, Edema, Enlarged prostate, Essential hypertension, H/O cardiovascular stress test, History of nuclear stress test, Hx of Venous Doppler ultrasound, HX OTHER MEDICAL, Hyperlipidemia, Lumbago, Neuromuscular disorder ([address] Neuropathy, Orthostatic hypotension, Osteoarthritis, PVD (peripheral vascular disease) ([address] Short-term memory loss, Thyroid disease, and Unstageable pressure ulcer of buttock ([address] Past surgical history:   has a past surgical history that includes joint replacement (01/25/2012); Laminotomy (04/06/2005); Adrenalectomy (01/06/1999); eye surgery (09/12/2003); eye surgery (12/01/1997); Total hip arthroplasty (Left, 2015); back surgery; Colonoscopy; Tonsillectomy (age 29); other surgical history (06/20/2019); Upper gastrointestinal endoscopy (N/A, 11/27/2018); and Colonoscopy (N/A, 11/27/2018). Social History:   reports that he has never smoked. He has never used smokeless tobacco. He reports that he does not drink alcohol or use drugs. Family history:  family history includes Cancer in his brother; Early Death in his brother; Heart Disease in his father and mother; High Blood Pressure in his father and mother.     Allergies   Allergen Reactions    Fish Allergy Nausea And Vomiting         Objective:   /76   Pulse 59   Temp 98.2 °F (36.8 °C) (Oral)   Resp 14   Ht 6' (1.829 m)   Wt 237 lb 6.4 oz (107.7 kg)

## 2021-05-06 NOTE — PROGRESS NOTES
Bradycardia  Dizziness / Vertigo  Syncope  HTN  BPH      Patient reported episodes of syncope one was near syncopal when he was standing in the kitchen when he got really dizzy and he held on and when he was laying down he came to quickly but he never passed out. The other was when he was on the commode and he passed out and he was not sure how long he was out for and he was going for the bathroom at that time. Patient wife reports that he has not passed out along and it was immediate. As soon as they laid him down he came to. I reviewed the telemetry patient had sinus bradycardia with non significant pause. It is not Mobitz type II AV block. Patient does not appear to have symptomatic bradycardia at this time. Patient actually at this time reports that the room was spinning and having dizziness when the heart rate is in 50s and 60s and the blood pressure is normal.  My concern was either it is positional vertigo or balance issues. Neurology evaluated the patient too    I would recommend an event monitor as an outpatient to watch further his cardiac rhythms if he has symptoms again. It is possible that patient may be having 2 different symptoms of dizziness and episode of syncope but the description of his syncope could be vasovagal.  There is history of orthostatic hypotension in the history and it is possible that patient is having intermittent orthostatic hypotension and that is causing his syncope especially when he is having vertigo    I discussed with the patient and the family in detail. Patient wife reports he had similar kind of issues in October where he had balance issues and he was not eating well and had similar kind of problems and then he recovered and then again it started back again.     We will follow along    Recommend event monitor at the time of discharge    Full note to follow

## 2021-05-06 NOTE — DISCHARGE SUMMARY
he says was recently changed. However the dosage of his medication for baclofen and morphine had not been changed in the pain pump, as per the patient. There was a 2.1-second pause on telemetry. Patient was seen by EP, who stated this was sinus bradycardia not Mobitz type II heart block. EP recommended outpatient follow-up for event monitor. Patient will need follow-up with both cardiology and neurology as an outpatient for further work-up. Due to patient's bradycardia his donepezil was stopped. Patient initially had low blood pressure on arrival.  His ramipril was stopped. Patient was stable. He still had some dizziness, but did not have any further syncope. He will need outpatient follow-up with his specialists for further work-up. He was discharged to home. Physical Exam:  /68   Pulse 54   Temp 98.3 °F (36.8 °C) (Oral)   Resp 13   Ht 6' (1.829 m)   Wt 237 lb 6.4 oz (107.7 kg)   SpO2 99%   BMI 32.20 kg/m²   CONSTITUTIONAL:  No acute distress, laying in bed  EYES:  No discharge  HENT:  NCAT  LUNGS:  cta b/l bs+  CARDIOVASCULAR:  s1s2 rrr  ABDOMEN:  Soft nt nd  MUSCULOSKELETAL/Extremities:  Trace edema in LE  NEUROLOGIC:  No gross deficits, aao  SKIN:  No gross lesions    Patient Instructions: Follow-up With  Details  Why  Contact Info   Jana Garcia PA-C  Schedule an appointment as soon as possible for a visit in 1 week  for follow up of your hospital stay  [address] [facility],   Schedule an appointment as soon as possible for a visit in 2 weeks  to check on your dizziness  [address]   Snow Collier MD  Schedule an appointment as soon as possible for a visit in 1 week  to check on your heart and your dizziness  [address] [address] [phone]         Medications: see computerized discharge medication list  Activity: activity as tolerated  Diet: regular diet   Disposition: home  Discharged Condition: Stable      The patient was seen and examined on day of discharge and this discharge summary is in conjunction with any daily progress note from day of discharge. Time spent on discharge is 39 minutes in the examination, evaluation, counseling and review of medications and discharge plan. Discharge Medications:     Medication List      START taking these medications    aspirin 81 MG chewable tablet  Commonly known as: Aspirin Childrens  Take 1 tablet by mouth daily        CHANGE how you take these medications    levocetirizine 5 MG tablet  Commonly known as: XYZAL  TAKE ONE TABLET BY MOUTH DAILY  What changed:   · how much to take  · when to take this        CONTINUE taking these medications    clotrimazole-betamethasone 1-0.05 % cream  Commonly known as: LOTRISONE     Compression Stockings Misc  by Does not apply route 20 - 30 mmh Wear Daily Can Take Off At Night  Thigh High     DULoxetine 30 MG extended release capsule  Commonly known as: CYMBALTA     folic acid 1 MG tablet  Commonly known as: FOLVITE  Take 1 tablet by mouth daily     levothyroxine 125 MCG tablet  Commonly known as: SYNTHROID  Take 2 tablets by mouth daily     magnesium chloride 64 MG Tbec extended release tablet  Commonly known as: MAG DELAY     oxaprozin 600 MG tablet  Commonly known as: DAYPRO  Take 1 tablet by mouth daily     oxyCODONE-acetaminophen 5-325 MG per tablet  Commonly known as: PERCOCET     trospium 60 MG Cp24 extended release capsule  Commonly known as:  Sanctura XR  Take 1 capsule by mouth daily     UNABLE TO FIND        STOP taking these medications    donepezil 10 MG tablet  Commonly known as: ARICEPT     ramipril 5 MG capsule  Commonly known as: Altace           Where to Get Your Medications      These medications were sent to [address], [facility] [address]    Phone: [phone]   · aspirin 81 MG chewable tablet         Consultations  IP CONSULT TO CARDIOLOGY  IP CONSULT TO HOSPITALIST  IP CONSULT TO NEUROLOGY  IP CONSULT TO ELECTROPHYSIOLOGY    Invasive procedures:   none    Significant Diagnostic Studies:    Imaging  Echocardiogram Complete 2d With Doppler With Color  Result Date: 5/4/2021  Conclusions   Summary  Very poor quality echocardiogram due to body habitus and lung artifact. Definity contrast utilized. Left ventricular systolic function is normal.  Ejection fraction is visually estimated at 55%. Mild left ventricular hypertrophy. Moderately dilated left atrium. No evidence of any pericardial effusion. Ct Head Wo Contrast  Result Date: 5/3/2021  No acute intracranial abnormality. Vl Dup Carotid Bilateral  Result Date: 5/4/2021  The right internal carotid artery demonstrates 0-50% stenosis. The left internal carotid artery demonstrates 0-50% stenosis. Bilateral vertebral arteries are patent with flow in the normal direction. Cta Pulmonary W Contrast  Result Date: 5/3/2021  1. No pulmonary embolism. 2. No acute pulmonary disease. 3. Calcific atherosclerosis coronary arteries. 4. Ankylosis of the thoracic vertebra can be seen with ankylosing spondylitis. Mri Brain Wo Contrast  Result Date: 5/5/2021  1. No acute intracranial abnormality. 2. Mild chronic white matter microvascular ischemic changes with chronic lacunar infarct in the left basal ganglia.        Code Status:  Full Code        Alvarez Moncada MD

## 2021-05-06 NOTE — PLAN OF CARE
Problem: Pain:  Goal: Pain level will decrease  Description: Pain level will decrease  5/6/2021 0416 by Arlette White RN  Outcome: Ongoing  5/6/2021 0416 by Arlette White RN  Outcome: Ongoing  Goal: Control of acute pain  Description: Control of acute pain  5/6/2021 0416 by Arlette White RN  Outcome: Ongoing  5/6/2021 0416 by Arlette White RN  Outcome: Ongoing  Goal: Control of chronic pain  Description: Control of chronic pain  5/6/2021 0416 by Arlette White RN  Outcome: Ongoing  5/6/2021 0416 by Arlette White RN  Outcome: Ongoing     Problem: Skin Integrity:  Goal: Will show no infection signs and symptoms  Description: Will show no infection signs and symptoms  5/6/2021 0416 by Arlette White RN  Outcome: Ongoing  5/6/2021 0416 by Arlette White RN  Outcome: Ongoing  Goal: Absence of new skin breakdown  Description: Absence of new skin breakdown  5/6/2021 0416 by Arlette White RN  Outcome: Ongoing  5/6/2021 0416 by Arlette White RN  Outcome: Ongoing     Problem: Falls - Risk of:  Goal: Will remain free from falls  Description: Will remain free from falls  5/6/2021 0416 by Arlette White RN  Outcome: Ongoing  5/6/2021 0416 by Arlette White RN  Outcome: Ongoing  Goal: Absence of physical injury  Description: Absence of physical injury  5/6/2021 0416 by Arlette White RN  Outcome: Ongoing  5/6/2021 0416 by Arlette White RN  Outcome: Ongoing

## 2021-05-06 NOTE — CONSULTS
Electrophysiology Consult Note      Reason for consultation: Syncope and dizziness 2.1-second pause noted on telemetry    Chief complaint : Dizziness    Referring physician: Dr. Baker Police      Primary care physician: Rodolfo Osborne PA-C      History of Present Illness:     Patient is 45-year-old male with a history of chronic back pain, multiple back surgeries, hypertension, hyperlipidemia, neuromuscular disorder, orthostatic hypotension, neuropathy, PVD, thyroid disease admitted with syncope and chest pain. He additionally complained of dizziness and weakness. EP was consulted for pause noted on telemetry patient had 2.17-second pause on telemetry last evening around 8 PM.  Patient reports he did not lose consciousness. He states he was not aware that a pause was noted on telemetry. Upon further investigation patient reported he had 2 syncopal episodes last week. He states that first episode open when he was standing at the kitchen sink on Wednesday. He became dizzy and he was able to grab onto something before he passed out. He quickly came to though. He reports that on Thursday of last week he was dictating on the toilet when he had another episode. He reports that at that time he was also dizzy before passing out. Patient reports that he has chronic dizziness since last August.  He states that last August he was having chronic dizziness and inability to put words together. He states that symptoms gradually improved however did not resolve. 2 to 3 weeks ago the dizziness increased in severity. He states that 2 weeks ago his Cymbalta was increased from 30 mg to 60 mg. He additionally reports that he has had multiple back surgeries. He is unable to move his neck from side to side. He is unable to stand for a long period of time due to back pain. Additionally tells me that he has problems swallowing and is difficult intubation.   He tells me that he has a curvature in his back and is unable to stand straight up    He states that he has been seen by neurosurgery in the past and is told there is nothing they could do for him. He is even followed at [facility] and states he was not a candidate for spinal replacement    This admission he had an echo with an EF of 55% no valvular heart disease. Carotid Doppler was obtained and patient noted to have 0 to 50% stenosis in the right and left internal carotid artery. Troponins have been negative x3    MRI of brain is no acute intracranial abnormality. Patient did have orthostatic blood pressures obtained this admission and he was positive for that. Patient denies chest pain, palpitations, shortness of breath or edema      Pastmedical history:   Past Medical History:   Diagnosis Date    Arthritis     Blood circulation, collateral     Chronic back pain     Chronic venous hypertension with ulcer ([address]     Difficult intubation     Edema     Enlarged prostate     Essential hypertension 4/19/2016    H/O cardiovascular stress test 08/06/2018    NM Normal study    History of nuclear stress test 08/06/2018    Normal LV function    Hx of Venous Doppler ultrasound 08/15/2018    No evidence of DVT or SVT No significant reflux noted in the veins of the right lower extremity.  Significant reflux noted in the Left CFV and mid FV,    HX OTHER MEDICAL     \"have trouble swallowing sometimes and they have had trouble putting the tube down my throat in the past- have a letter will bring in copy for anesthesia    Hyperlipidemia     Lumbago     Neuromuscular disorder ([address]     Neuropathy     Orthostatic hypotension     Osteoarthritis     PVD (peripheral vascular disease) (HCC)     Short-term memory loss     on medication    Thyroid disease     Unstageable pressure ulcer of buttock ([address]        Surgical history :   Past Surgical History:   Procedure Laterality Date    ADRENALECTOMY  01/06/1999   formerly [facility] BACK SURGERY \"had back surgery total of 6 times over past 25 yrs- last one 2005- first one was in [address]      \"had colonoscopy 40 yrs ago\"    COLONOSCOPY N/A 11/27/2018    COLONOSCOPY POLYPECTOMY SNARE/COLD BIOPSY performed by Carli Delacruz MD at [facility].  09/12/2003    cataract removal right eye    EYE SURGERY  12/01/1997    cataract removal left eye    JOINT REPLACEMENT  01/25/2012    complete right hip     LAMINOTOMY  04/06/2005    microdiskectomy L3-L4    OTHER SURGICAL HISTORY  06/20/2019    SYNCHROMED II 1760-87 MRI CONDITONAL (prev pump removed 2006, 20012)    TONSILLECTOMY  age 29   [address] Left 2015    UPPER GASTROINTESTINAL ENDOSCOPY N/A 11/27/2018    EGD BIOPSY performed by Carli Delacruz MD at [facility] ENDOSCOPY       Family history:   Family History   Problem Relation Age of Onset    Heart Disease Mother     High Blood Pressure Mother     Heart Disease Father     High Blood Pressure Father     Early Death Brother     Cancer Brother        Social history :  reports that he has never smoked. He has never used smokeless tobacco. He reports that he does not drink alcohol or use drugs. Allergies   Allergen Reactions    Fish Allergy Nausea And Vomiting       No current facility-administered medications on file prior to encounter.       Current Outpatient Medications on File Prior to Encounter   Medication Sig Dispense Refill    magnesium chloride (MAG DELAY) 64 MG TBEC extended release tablet Take 1 tablet by mouth 2 times daily      DULoxetine (CYMBALTA) 30 MG extended release capsule Take 60 mg by mouth daily       donepezil (ARICEPT) 10 MG tablet Take 10 mg by mouth nightly      oxaprozin (DAYPRO) 600 MG tablet Take 1 tablet by mouth daily 30 tablet 5    folic acid (FOLVITE) 1 MG tablet Take 1 tablet by mouth daily 30 tablet 5    ramipril (ALTACE) 5 MG capsule Take 1 capsule by mouth daily (Patient taking differently: Take 2.5 mg by mouth nightly ) 30 capsule 5    trospium (SANCTURA XR) 60 MG CP24 extended release capsule Take 1 capsule by mouth daily 30 capsule 5    levothyroxine (SYNTHROID) 125 MCG tablet Take 2 tablets by mouth daily 60 tablet 5    levocetirizine (XYZAL) 5 MG tablet TAKE ONE TABLET BY MOUTH DAILY (Patient taking differently: 5 mg nightly TAKE ONE TABLET BY MOUTH DAILY) 30 tablet 5    clotrimazole-betamethasone (LOTRISONE) cream Apply  topically every 72 hours. Apply every 3 days with dressing changes.  oxyCODONE-acetaminophen (PERCOCET) 5-325 MG per tablet Take 1 tablet by mouth every 4 hours as needed.  UNABLE TO FIND Infusion pump with Baclofen pump and Morphine  - cONTINUOUSLY      Compression Stockings MISC by Does not apply route 20 - 30 mmh Wear Daily Can Take Off At Night  Thigh High 1 each 1       Review of Systems:   Review of Systems   Constitutional: Positive for activity change and fatigue. Negative for chills and fever. HENT: Negative for congestion, ear pain and tinnitus. Eyes: Negative for photophobia, pain and visual disturbance. Respiratory: Negative for cough, chest tightness, shortness of breath and wheezing. Cardiovascular: Negative for chest pain, palpitations and leg swelling. Gastrointestinal: Negative for abdominal pain, blood in stool, constipation, diarrhea, nausea and vomiting. Endocrine: Negative for cold intolerance and heat intolerance. Genitourinary: Negative for dysuria, flank pain and hematuria. Musculoskeletal: Positive for back pain, gait problem, myalgias and neck pain. Negative for arthralgias and neck stiffness. Skin: Negative for color change and rash. Allergic/Immunologic: Negative for food allergies. Neurological: Positive for dizziness and syncope. Negative for light-headedness, numbness and headaches. Hematological: Does not bruise/bleed easily. Psychiatric/Behavioral: Negative for agitation, behavioral problems and confusion. Examination:      Vitals:    05/06/21 0400 05/06/21 0800 05/06/21 0901 05/06/21 1155   BP: 128/73 134/76  133/68   Pulse: 58 59  54   Resp: 12 14 15 13   Temp: 97.4 °F (36.3 °C) 98.2 °F (36.8 °C)  98.3 °F (36.8 °C)   TempSrc:  Oral  Oral   SpO2:  99% 99%    Weight:       Height:            Body mass index is 32.2 kg/m². Physical Exam  Constitutional:       Appearance: Normal appearance. He is ill-appearing. Comments: Patient is rotated to the side because of his chronic back pain   HENT:      Head: Normocephalic and atraumatic. Mouth/Throat:      Mouth: Mucous membranes are moist.   Eyes:      Conjunctiva/sclera: Conjunctivae normal.   Neck:      Musculoskeletal: Normal range of motion. Cardiovascular:      Rate and Rhythm: Regular rhythm. Bradycardia present. Heart sounds: No murmur. Pulmonary:      Effort: Pulmonary effort is normal.      Breath sounds: No rales. Abdominal:      General: Abdomen is flat. Palpations: Abdomen is soft. Musculoskeletal:      Right lower leg: No edema. Left lower leg: No edema. Comments: Back and neck deformity   Skin:     General: Skin is warm and dry. Neurological:      General: No focal deficit present. Mental Status: He is alert and oriented to person, place, and time.             CBC:   Lab Results   Component Value Date    WBC 5.4 05/05/2021    HGB 13.0 05/05/2021    HCT 41.3 05/05/2021     05/05/2021     Lipids:  Lab Results   Component Value Date    CHOL 169 02/08/2017    TRIG 140 02/08/2017    HDL 29 (L) 02/08/2017    LDLCALC 112 (H) 02/08/2017    LDLDIRECT 117 (H) 10/17/2016     PT/INR:   Lab Results   Component Value Date    INR 1.06 02/06/2012        BMP:    Lab Results   Component Value Date     05/05/2021    K 4.6 05/05/2021    CL 97 (L) 05/05/2021    CO2 31 05/05/2021    BUN 13 05/05/2021     CMP:   Lab Results   Component Value Date    AST 19 05/05/2021    PROT 6.8 05/05/2021    BILITOT 0.4 05/05/2021 ALKPHOS 85 05/05/2021     TSH:  No results found for: TSH    EKGINTERPRETATION - EKG Interpretation:        IMPRESSION / RECOMMENDATIONS:       Bradycardia  Dizziness / Vertigo  Syncope  HTN  BPH        Patient reported episodes of syncope one was near syncopal when he was standing in the kitchen when he got really dizzy and he held on and when he was laying down he came to quickly but he never passed out. The other was when he was on the commode and he passed out and he was not sure how long he was out for and he was going for the bathroom at that time. Patient wife reports that he has not passed out along and it was immediate. As soon as they laid him down he came to.     I reviewed the telemetry patient had sinus bradycardia with non significant pause. It is not Mobitz type II AV block. Patient does not appear to have symptomatic bradycardia at this time. Patient actually at this time reports that the room was spinning and having dizziness when the heart rate is in 50s and 60s and the blood pressure is normal.  My concern was either it is positional vertigo or balance issues. Neurology evaluated the patient too     I would recommend an event monitor as an outpatient to watch further his cardiac rhythms if he has symptoms again.     It is possible that patient may be having 2 different symptoms of dizziness and episode of syncope but the description of his syncope could be vasovagal.  There is history of orthostatic hypotension in the history and it is possible that patient is having intermittent orthostatic hypotension and that is causing his syncope especially when he is having vertigo     I discussed with the patient and the family in detail.   Patient wife reports he had similar kind of issues in October where he had balance issues and he was not eating well and had similar kind of problems and then he recovered and then again it started back again.     We will follow along     Recommend event monitor at the time of discharge        Thanks again for allowing me to participate in care of this patient. Please call me if you have any questions. With best regards. Anabel Julian MD, 5/6/2021 2:32 PM     Please note this report has been partially produced using speech recognition software and may contain errors related to that system including errors in grammar, punctuation, and spelling, as well as words and phrases that may be inappropriate. If there are any questions or concerns please feel free to contact the dictating provider for clarification.

## 2021-05-11 ENCOUNTER — TELEPHONE (OUTPATIENT)
Dept: CARDIOLOGY CLINIC | Age: 76
End: 2021-05-11

## 2021-05-13 ASSESSMENT — ENCOUNTER SYMPTOMS
PHOTOPHOBIA: 0
NAUSEA: 0
EYE PAIN: 0
WHEEZING: 0
ABDOMINAL PAIN: 0
BLOOD IN STOOL: 0
CONSTIPATION: 0
CHEST TIGHTNESS: 0
VOMITING: 0
COLOR CHANGE: 0
COUGH: 0
DIARRHEA: 0
SHORTNESS OF BREATH: 0
BACK PAIN: 1

## 2021-10-15 ENCOUNTER — OFFICE VISIT (OUTPATIENT)
Dept: NEUROLOGY | Age: 76
End: 2021-10-15
Payer: MEDICARE

## 2021-10-15 VITALS
SYSTOLIC BLOOD PRESSURE: 142 MMHG | HEART RATE: 67 BPM | BODY MASS INDEX: 32.14 KG/M2 | DIASTOLIC BLOOD PRESSURE: 84 MMHG | OXYGEN SATURATION: 98 % | WEIGHT: 237 LBS

## 2021-10-15 DIAGNOSIS — M79.2 NEUROPATHIC PAIN: ICD-10-CM

## 2021-10-15 DIAGNOSIS — G47.00 INSOMNIA, UNSPECIFIED TYPE: ICD-10-CM

## 2021-10-15 DIAGNOSIS — G31.84 MCI (MILD COGNITIVE IMPAIRMENT): Primary | ICD-10-CM

## 2021-10-15 DIAGNOSIS — G47.52 REM SLEEP BEHAVIOR DISORDER: ICD-10-CM

## 2021-10-15 PROCEDURE — 99214 OFFICE O/P EST MOD 30 MIN: CPT | Performed by: PSYCHIATRY & NEUROLOGY

## 2021-10-15 RX ORDER — CLONAZEPAM 0.5 MG/1
0.25 TABLET ORAL NIGHTLY
Qty: 30 TABLET | Refills: 2 | Status: SHIPPED | OUTPATIENT
Start: 2021-10-15 | End: 2021-11-14

## 2021-10-15 RX ORDER — PREGABALIN 75 MG/1
75 CAPSULE ORAL 2 TIMES DAILY
COMMUNITY
Start: 2021-09-20

## 2021-10-18 NOTE — PROGRESS NOTES
10/17/21    [address] [place] [address]  1945    Chief Complaint   Patient presents with    Follow-up     memory issues have gotten worse in the last 2 months. Hospital follow up low heart rate. Last hospital  6/4/21 at [facility]       History of Present Illness  Shanon Valerio is a 68 y.o. male presenting today for follow-up of:  Shanon Valerio is seen in follow-up today for mild cognitive impairment, neuropathic pain, and sleep. His donepezil was stopped in May due to bradycardia. He was started back on memantine and though they feel it did help his memory, this was stopped again by another provider due to the possibility of cardiac side effects. He still has significant issues with sleep. Melatonin was not helpful. He had tried trazodone in the past which was also not helpful. Current Outpatient Medications   Medication Sig Dispense Refill    pregabalin (LYRICA) 75 MG capsule Take 75 mg by mouth 2 times daily.  clonazePAM (KLONOPIN) 0.5 MG tablet Take 0.5 tablets by mouth nightly for 30 days.  30 tablet 2    aspirin (ASPIRIN CHILDRENS) 81 MG chewable tablet Take 1 tablet by mouth daily 30 tablet 0    magnesium chloride (MAG DELAY) 64 MG TBEC extended release tablet Take 1 tablet by mouth 2 times daily      DULoxetine (CYMBALTA) 30 MG extended release capsule Take 60 mg by mouth daily       oxaprozin (DAYPRO) 600 MG tablet Take 1 tablet by mouth daily 30 tablet 5    folic acid (FOLVITE) 1 MG tablet Take 1 tablet by mouth daily 30 tablet 5    trospium (SANCTURA XR) 60 MG CP24 extended release capsule Take 1 capsule by mouth daily 30 capsule 5    levothyroxine (SYNTHROID) 125 MCG tablet Take 2 tablets by mouth daily (Patient taking differently: Take 137 mcg by mouth daily 2 pills in the AM) 60 tablet 5    levocetirizine (XYZAL) 5 MG tablet TAKE ONE TABLET BY MOUTH DAILY (Patient taking differently: 5 mg nightly TAKE ONE TABLET BY MOUTH DAILY) 30 tablet 5    Compression Stockings MISC by Does not apply route 20 - 30 mmh Wear Daily Can Take Off At Night  Thigh High (Patient not taking: Reported on 10/15/2021) 1 each 1     No current facility-administered medications for this visit. Physical Exam:  Also present during visit: spouse     Mental Status   Orientation: oriented to person, oriented to place, oriented to problem and oriented to time    Mood/affectappropriate mood and appropriate affect   Memory/Other: remote memory intact, fund of knowledge intact, attention span normal, concentration normal and recent memory impaired, (9/11/2020 = 29/30)  Language  Language: (normal) language, no dysarthria, (normal) articulation and no dysphasia/aphasia  Cranial Nerves   Eyes: pupils normal size and reactive to light and visual fields appear full   CN III, IV, VI : extraocular muscle strength normal, normal pursuit, no nystagmus and no ptosis   Facial Motor: normal facial motor  Motor/Coordination Exam   Power: motor strength appears intact throughout, no arm drift and normal tone   Coordination: normal finger-to-nose, forearm rotation intact and rapid alternating movement normal  Gait and Stance   Gait/Posture: station normal, casual gait normal and ambulates independently         BP (!) 142/84 (Site: Left Upper Arm, Position: Sitting, Cuff Size: Medium Adult)   Pulse 67   Wt 237 lb (107.5 kg)   SpO2 98%   BMI 32.14 kg/m²     Assessment and Plan     Diagnosis Orders   1. MCI (mild cognitive impairment)     2. Insomnia, unspecified type  clonazePAM (KLONOPIN) 0.5 MG tablet   3. REM sleep behavior disorder  clonazePAM (KLONOPIN) 0.5 MG tablet   4. Neuropathic pain       Lorin Mcadams, his wife, and I talked about multiple aspects of his care today and decided upon the following plan    1) mild cognitive impairment -- donepezil, and acetylcholinesterase inhibitor, was discontinued in May due to bradycardia. Memantine was also discontinued by another provider due to concerns of cardiac side effects.   Typically, it is donepezil and related agents that have the cardiac conduction side effect concerns. Nonetheless, memantine has now been discontinued twice by different providers and it is fair to continue him off this at this time. I will contact our research team and infusion staff at the main office in [facility] to see where we are in regards to getting Aduhelm, the newest FDA approved drug for dementia (an infusion), set up through our office for patients to get approved for and receive. It does appear that Manish Moody will have to attend educational sessions regarding Aduhelm and the process of getting approved. If he does seem like a candidate and wants to move forward, I can certainly order the appropriate tests to see if he may be a good candidate for the therapy. 2) lower extremity neuropathic pain -- continue Cymbalta 60 mg daily. 3) insomnia with REM related sleep behavior disorder -- start clonazepam 0.5 mg tablets, one half a tablet at bedtime to be titrated up to 1 tablet at bedtime if needed thereafter. He had previously failed melatonin and trazodone. Return in about 4 months (around 2/15/2022) for me or [facility].     Madelaine Kimbrough DO

## 2022-02-16 ENCOUNTER — APPOINTMENT (OUTPATIENT)
Dept: CT IMAGING | Age: 77
End: 2022-02-16
Payer: MEDICARE

## 2022-02-16 ENCOUNTER — HOSPITAL ENCOUNTER (EMERGENCY)
Age: 77
Discharge: ANOTHER HOSPITAL | End: 2022-02-16
Attending: EMERGENCY MEDICINE
Payer: MEDICARE

## 2022-02-16 VITALS
HEART RATE: 76 BPM | BODY MASS INDEX: 32.1 KG/M2 | WEIGHT: 237 LBS | RESPIRATION RATE: 21 BRPM | DIASTOLIC BLOOD PRESSURE: 87 MMHG | HEIGHT: 72 IN | OXYGEN SATURATION: 97 % | SYSTOLIC BLOOD PRESSURE: 121 MMHG | TEMPERATURE: 97.2 F

## 2022-02-16 DIAGNOSIS — S12.490A OTHER CLOSED DISPLACED FRACTURE OF FIFTH CERVICAL VERTEBRA, INITIAL ENCOUNTER (HCC): ICD-10-CM
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
## 2022-02-16 DIAGNOSIS — S12.390A OTHER CLOSED DISPLACED FRACTURE OF FOURTH CERVICAL VERTEBRA, INITIAL ENCOUNTER (HCC): Primary | ICD-10-CM

## 2022-02-16 DIAGNOSIS — R41.82 ALTERED MENTAL STATUS, UNSPECIFIED ALTERED MENTAL STATUS TYPE: ICD-10-CM

## 2022-02-16 LAB
ALBUMIN SERPL-MCNC: 3.4 GM/DL (ref 3.4–5)
ALP BLD-CCNC: 67 IU/L (ref 40–129)
ALT SERPL-CCNC: 9 U/L (ref 10–40)
ANION GAP SERPL CALCULATED.3IONS-SCNC: 14 MMOL/L (ref 4–16)
APTT: 34.4 SECONDS (ref 25.1–37.1)
AST SERPL-CCNC: 24 IU/L (ref 15–37)
BASOPHILS ABSOLUTE: 0 K/CU MM
BASOPHILS RELATIVE PERCENT: 0.1 % (ref 0–1)
BILIRUB SERPL-MCNC: 0.7 MG/DL (ref 0–1)
BUN BLDV-MCNC: 34 MG/DL (ref 6–23)
CALCIUM SERPL-MCNC: 9.9 MG/DL (ref 8.3–10.6)
CHLORIDE BLD-SCNC: 91 MMOL/L (ref 99–110)
CO2: 28 MMOL/L (ref 21–32)
CREAT SERPL-MCNC: 1.3 MG/DL (ref 0.9–1.3)
DIFFERENTIAL TYPE: ABNORMAL
EKG ATRIAL RATE: 82 BPM
EKG DIAGNOSIS: NORMAL
EKG P AXIS: 65 DEGREES
EKG P-R INTERVAL: 188 MS
EKG Q-T INTERVAL: 410 MS
EKG QRS DURATION: 132 MS
EKG QTC CALCULATION (BAZETT): 479 MS
EKG R AXIS: 31 DEGREES
EKG T AXIS: 40 DEGREES
EKG VENTRICULAR RATE: 82 BPM
EOSINOPHILS ABSOLUTE: 0 K/CU MM
EOSINOPHILS RELATIVE PERCENT: 0 % (ref 0–3)
GFR AFRICAN AMERICAN: >60 ML/MIN/1.73M2
GFR NON-AFRICAN AMERICAN: 54 ML/MIN/1.73M2
GLUCOSE BLD-MCNC: 176 MG/DL (ref 70–99)
HCT VFR BLD CALC: 34.2 % (ref 42–52)
HEMOGLOBIN: 11 GM/DL (ref 13.5–18)
IMMATURE NEUTROPHIL %: 0.7 % (ref 0–0.43)
INR BLD: 1.51 INDEX
LYMPHOCYTES ABSOLUTE: 0.7 K/CU MM
LYMPHOCYTES RELATIVE PERCENT: 4 % (ref 24–44)
MCH RBC QN AUTO: 30.3 PG (ref 27–31)
MCHC RBC AUTO-ENTMCNC: 32.2 % (ref 32–36)
MCV RBC AUTO: 94.2 FL (ref 78–100)
MONOCYTES ABSOLUTE: 0.7 K/CU MM
MONOCYTES RELATIVE PERCENT: 4.5 % (ref 0–4)
NUCLEATED RBC %: 0 %
PDW BLD-RTO: 13.5 % (ref 11.7–14.9)
PLATELET # BLD: 287 K/CU MM (ref 140–440)
PMV BLD AUTO: 10 FL (ref 7.5–11.1)
POTASSIUM SERPL-SCNC: 3.9 MMOL/L (ref 3.5–5.1)
PROTHROMBIN TIME: 19.5 SECONDS (ref 11.7–14.5)
RBC # BLD: 3.63 M/CU MM (ref 4.6–6.2)
SEGMENTED NEUTROPHILS ABSOLUTE COUNT: 14.9 K/CU MM
SEGMENTED NEUTROPHILS RELATIVE PERCENT: 90.7 % (ref 36–66)
SODIUM BLD-SCNC: 133 MMOL/L (ref 135–145)
TOTAL IMMATURE NEUTOROPHIL: 0.11 K/CU MM
TOTAL NUCLEATED RBC: 0 K/CU MM
TOTAL PROTEIN: 6.4 GM/DL (ref 6.4–8.2)
TROPONIN T: 0.01 NG/ML
WBC # BLD: 16.4 K/CU MM (ref 4–10.5)

## 2022-02-16 PROCEDURE — 93005 ELECTROCARDIOGRAM TRACING: CPT | Performed by: NURSE PRACTITIONER

## 2022-02-16 PROCEDURE — 85730 THROMBOPLASTIN TIME PARTIAL: CPT

## 2022-02-16 PROCEDURE — 80053 COMPREHEN METABOLIC PANEL: CPT

## 2022-02-16 PROCEDURE — 96375 TX/PRO/DX INJ NEW DRUG ADDON: CPT

## 2022-02-16 PROCEDURE — 99284 EMERGENCY DEPT VISIT MOD MDM: CPT

## 2022-02-16 PROCEDURE — 72125 CT NECK SPINE W/O DYE: CPT

## 2022-02-16 PROCEDURE — 72131 CT LUMBAR SPINE W/O DYE: CPT

## 2022-02-16 PROCEDURE — 6360000002 HC RX W HCPCS: Performed by: NURSE PRACTITIONER

## 2022-02-16 PROCEDURE — 85610 PROTHROMBIN TIME: CPT

## 2022-02-16 PROCEDURE — 6360000004 HC RX CONTRAST MEDICATION: Performed by: NURSE PRACTITIONER

## 2022-02-16 PROCEDURE — 70450 CT HEAD/BRAIN W/O DYE: CPT

## 2022-02-16 PROCEDURE — 71260 CT THORAX DX C+: CPT

## 2022-02-16 PROCEDURE — 70498 CT ANGIOGRAPHY NECK: CPT

## 2022-02-16 PROCEDURE — 93010 ELECTROCARDIOGRAM REPORT: CPT | Performed by: INTERNAL MEDICINE

## 2022-02-16 PROCEDURE — 85025 COMPLETE CBC W/AUTO DIFF WBC: CPT

## 2022-02-16 PROCEDURE — 74177 CT ABD & PELVIS W/CONTRAST: CPT

## 2022-02-16 PROCEDURE — 96374 THER/PROPH/DIAG INJ IV PUSH: CPT

## 2022-02-16 PROCEDURE — 76376 3D RENDER W/INTRP POSTPROCES: CPT

## 2022-02-16 PROCEDURE — 84484 ASSAY OF TROPONIN QUANT: CPT

## 2022-02-16 RX ORDER — MIDODRINE HYDROCHLORIDE 5 MG/1
5 TABLET ORAL 2 TIMES DAILY
COMMUNITY

## 2022-02-16 RX ORDER — MORPHINE SULFATE 2 MG/ML
2 INJECTION, SOLUTION INTRAMUSCULAR; INTRAVENOUS ONCE
Status: COMPLETED | OUTPATIENT
Start: 2022-02-16 | End: 2022-02-16

## 2022-02-16 RX ORDER — ONDANSETRON 2 MG/ML
4 INJECTION INTRAMUSCULAR; INTRAVENOUS ONCE
Status: COMPLETED | OUTPATIENT
Start: 2022-02-16 | End: 2022-02-16

## 2022-02-16 RX ORDER — LANOLIN ALCOHOL/MO/W.PET/CERES
10 CREAM (GRAM) TOPICAL NIGHTLY PRN
COMMUNITY

## 2022-02-16 RX ORDER — 0.9 % SODIUM CHLORIDE 0.9 %
10 VIAL (ML) INJECTION
Status: COMPLETED | OUTPATIENT
Start: 2022-02-16 | End: 2022-02-16

## 2022-02-16 RX ORDER — DILTIAZEM HYDROCHLORIDE 120 MG/1
120 CAPSULE, EXTENDED RELEASE ORAL DAILY
COMMUNITY

## 2022-02-16 RX ADMIN — MORPHINE SULFATE 2 MG: 2 INJECTION, SOLUTION INTRAMUSCULAR; INTRAVENOUS at 10:42

## 2022-02-16 RX ADMIN — Medication 10 ML: at 10:34

## 2022-02-16 RX ADMIN — ONDANSETRON 4 MG: 2 INJECTION INTRAMUSCULAR; INTRAVENOUS at 10:42

## 2022-02-16 RX ADMIN — IOPAMIDOL 75 ML: 755 INJECTION, SOLUTION INTRAVENOUS at 10:34

## 2022-02-16 ASSESSMENT — PAIN SCALES - GENERAL: PAINLEVEL_OUTOF10: 7

## 2022-02-16 NOTE — ED PROVIDER NOTES
eMERGENCY dEPARTMENT eNCOUnter    Attending note    I cared for and evaluated the patient in conjunction with the ED Advanced Practice Provider    CRITICAL CARE NOTE:  There was a high probability of clinically significant life-threatening deterioration of the patient's condition requiring my urgent intervention. Total critical care time is 15 minutes  This includes vital sign monitoring, pulse oximetry monitoring, telemetry monitoring, clinical response to the IV medications, reviewing the nursing notes, consultation time, dictation/documetation time. (This time excludes time spent performing procedures). HPI/Physical Exam/Medical Decision Making  Benjie Stanley is a 68 y.o. male here via EMS, for evaluation of confusion and inability to move his extremities after he had a fall. The patient takes Eliquis. He has a history of dementia and is a poor historian. Wife states that the patient got up and was using his walker to go to the bathroom when he lost his balance and fell forward, striking the front of his head. He complains of pain all over. He is unable to move his arms or legs which is new since the fall. He arrived with a c-collar in place. Please see [facility] note for further details. Vitals:   ED Triage Vitals [02/16/22 1003]   Enc Vitals Group      BP (!) 132/93      Pulse       Resp 27      Temp       Temp Source Oral      SpO2 92 %      Weight 237 lb (107.5 kg)      Height 6' (1.829 m)      Head Circumference       Peak Flow       Pain Score       Pain Loc       Pain Edu? Excl. in [address]?         EKG Interpretation  Interpreted by me  Compared to 5/3/2021  Rhythm: normal sinus   Rate: normal 82  Axis: normal  Ectopy: none  Conduction: normal  ST Segments: no acute change  T Waves: chronic inverted t-waves in leads V2 and V3, new inverted t-waves in V4, V5, V6  Clinical Impression: normal sinus rhythm, chronic inverted T waves in leads V2 and V3, new inverted T waves in V4, V5 and V6    On exam, the patient is afebrile and does not appear toxic. He is hemodynamically stable. He does not follow commands. Pupils are equal round and reactive to light bilaterally. He is unable to move his arms or legs. Mouth is dry. Airway is patent. Neck is supple. Heart sounds have a regular rate and rhythm. Lungs are clear to auscultation bilaterally. The patient's abdomen is soft, non-tender and non-distended with no peritoneal signs. Extremities are warm, pink, and well perfused. EKG shows a normal sinus rhythm with chronic and new inverted T waves as above. There is no ST elevation or depression. Labs are obtained and are significant for leukocytosis and anemia with no other clinically significant lab abnormalities. CT head is negative for acute intracranial abnormality. CT cervical spine shows an unstable distracted fracture through the C4-C5 disc space with left-sided jumped facet, nondisplaced fractures of the spinous process of C4, left superior articular process and C5 and left transverse process of these of C5, C6.  CT chest, abdomen and pelvis with thoracic and lumbar spine reconstructions are pending. CTA of the head and neck is pending. The patient has an unstable C-spine fracture. He is unable to move any of his extremities. C-collar remains in place. The [facility] spoke with an ED attending at [place], Dr. Josselyn Paulson, who accepted the patient in transfer. The patient is being transferred by helicopter. All diagnostic, treatment, and disposition decisions were made by myself in conjunction with the advanced practice provider. For all further details of the patient's emergency department visit, please see the advanced practice provider's documentation.       Comment: Please note this report has been produced using speech recognition software and may contain errors related to that system including errors in grammar, punctuation, and spelling, as well as words and phrases that may be inappropriate. If there are any questions or concerns please feel free to contact the dictating provider for clarification.         Nadir Flannery MD  02/16/22 [id number]

## 2022-02-16 NOTE — ED NOTES
Handoff report given to SIXTO Escamilla at [facility] FLOR Cuenca RN  02/16/22 1128

## 2022-02-16 NOTE — ED PROVIDER NOTES
EMERGENCY DEPARTMENT ENCOUNTER      PCP: Opal Bashir PA-C    CHIEF COMPLAINT    Chief Complaint   Patient presents with    Trauma    Fall       Of note, this patient was also evaluated by the attending physician, Dr. Karley Hammer. HPI    Arley Newberry is a 68 y.o. male who presents via EMS for altered mental status and inability to move extremities s/p a fall. The patient has a history of dementia and takes Eliquis for A. fib. Wife states she saw him get up and use his walker to go to the bathroom this morning and he lost his balance falling forward striking the front of his head. She states they moved him from the bathroom into the bedroom and was asking him to assist, however he states he could not use his any of his extremities. He is complaining of pain all over and for EMS was complaining of shortness of breath  He will mouth words, however does not use full speech or answer appropriately. He is unable to follow commands on arrival.  Difficult historian due to his mental status at this time. Most information obtained from wife. Wife states his tetanus is up-to-date. REVIEW OF SYSTEMS    Difficult to obtain from patient due to altered mental status. See HPI and nursing notes for additional information     PAST MEDICAL AND SURGICAL HISTORY    Past Medical History:   Diagnosis Date    Arthritis     Blood circulation, collateral     Chronic back pain     Chronic venous hypertension with ulcer ([facility] [address]     Difficult intubation     Edema     Enlarged prostate     Essential hypertension 04/19/2016    H/O cardiovascular stress test 08/06/2018    NM Normal study    History of nuclear stress test 08/06/2018    Normal LV function    Hx of Venous Doppler ultrasound 08/15/2018    No evidence of DVT or SVT No significant reflux noted in the veins of the right lower extremity.  Significant reflux noted in the Left CFV and mid FV,    HX OTHER MEDICAL     \"have trouble swallowing sometimes and they have had trouble putting the tube down my throat in the past- have a letter will bring in copy for anesthesia    Hyperlipidemia     Lacunar infarction ([facility] [address] 05/05/2021    Chronic    Lumbago     Neuromuscular disorder ([facility] [address]     Neuropathy     Orthostatic hypotension     Osteoarthritis     PVD (peripheral vascular disease) ([facility] [address]     Short-term memory loss     on medication    Thyroid disease     Unstageable pressure ulcer of buttock ([facility] [address]      Past Surgical History:   Procedure Laterality Date    ADRENALECTOMY  01/06/1999    BACK SURGERY      \"had back surgery total of 6 times over past 25 yrs- last one 2005- first one was [address]      \"had colonoscopy 40 yrs ago\"    COLONOSCOPY N/A 11/27/2018    COLONOSCOPY POLYPECTOMY SNARE/COLD BIOPSY performed by Marcello Nowak MD at [address]  09/12/2003    cataract removal right eye    EYE SURGERY  12/01/1997    cataract removal left eye    JOINT REPLACEMENT  01/25/2012    complete right hip     LAMINOTOMY  04/06/2005    microdiskectomy L3-L4    OTHER SURGICAL HISTORY  06/20/2019    [facility]prev pump removed 2006, 20012)    TONSILLECTOMY  age 29   [address] Left 2015    UPPER GASTROINTESTINAL ENDOSCOPY N/A 11/27/2018    EGD BIOPSY performed by Marcello Nowak MD at [address] [place] [address]    Current Outpatient Rx   Medication Sig Dispense Refill    apixaban (ELIQUIS) 5 MG TABS tablet Take by mouth 2 times daily      dilTIAZem (CARDIZEM 12 HR) 120 MG extended release capsule Take 120 mg by mouth daily      midodrine (PROAMATINE) 5 MG tablet Take 5 mg by mouth in the morning and at bedtime 1.5tab   11 am and 5pm      melatonin 3 MG TABS tablet Take 10 mg by mouth nightly as needed      pregabalin (LYRICA) 75 MG capsule Take 75 mg by mouth 2 times daily.       magnesium chloride (MAG DELAY) 64 MG TBEC extended release tablet Take 1 tablet by mouth 2 times daily 535mg      DULoxetine (CYMBALTA) 30 MG extended release capsule Take 60 mg by mouth daily       oxaprozin (DAYPRO) 600 MG tablet Take 1 tablet by mouth daily 30 tablet 5    folic acid (FOLVITE) 1 MG tablet Take 1 tablet by mouth daily 30 tablet 5    trospium (SANCTURA XR) 60 MG CP24 extended release capsule Take 1 capsule by mouth daily 30 capsule 5    levothyroxine (SYNTHROID) 125 MCG tablet Take 2 tablets by mouth daily (Patient taking differently: Take by mouth daily 274 mcg in the am) 60 tablet 5    levocetirizine (XYZAL) 5 MG tablet TAKE ONE TABLET BY MOUTH DAILY (Patient taking differently: 5 mg nightly TAKE ONE TABLET BY MOUTH DAILY) 30 tablet 5    clonazePAM (KLONOPIN) 0.5 MG tablet Take 0.5 tablets by mouth nightly for 30 days.  30 tablet 2    aspirin (ASPIRIN CHILDRENS) 81 MG chewable tablet Take 1 tablet by mouth daily 30 tablet 0    Compression Stockings MISC by Does not apply route 20 - 30 mmh Wear Daily Can Take Off At Night  Thigh High (Patient not taking: Reported on 10/15/2021) 1 each 1       ALLERGIES    Allergies   Allergen Reactions    Fish Allergy Nausea And Vomiting       SOCIAL AND FAMILY HISTORY    Social History     Socioeconomic History    Marital status:      Spouse name: None    Number of children: None    Years of education: None    Highest education level: None   Occupational History    None   Tobacco Use    Smoking status: Never Smoker    Smokeless tobacco: Never Used   Vaping Use    Vaping Use: Never used   Substance and Sexual Activity    Alcohol use: No    Drug use: No    Sexual activity: None   Other Topics Concern    None   Social History Narrative    None     Social Determinants of Health     Financial Resource Strain:     Difficulty of Paying Living Expenses: Not on file   Food Insecurity:     Worried About Running Out of Food in the Last Year: Not on file    Hilario of Food in the Last Year: Not on file   Transportation Needs:     Lack of lower extremities  No range of motion in bilateral upper or lower extremities. Integument:  Warm, Dry. Centimeter laceration forehead. See HENT above. Neurologic: Alert. Unable to follow commands. Pupils equal round and reactive to light. Pupils are 3 mm bilaterally. EOMs grossly intact. Unable to move upper or lower extremities.   Psychiatric:  Affect normal, Mood normal.       Labs:  Results for orders placed or performed during the hospital encounter of 02/16/22   CBC with Auto Differential   Result Value Ref Range    WBC 16.4 (H) 4.0 - 10.5 K/CU MM    RBC 3.63 (L) 4.6 - 6.2 M/CU MM    Hemoglobin 11.0 (L) 13.5 - 18.0 GM/DL    Hematocrit 34.2 (L) 42 - 52 %    MCV 94.2 78 - 100 FL    MCH 30.3 27 - 31 PG    MCHC 32.2 32.0 - 36.0 %    RDW 13.5 11.7 - 14.9 %    Platelets 944 226 - 031 K/CU MM    MPV 10.0 7.5 - 11.1 FL    Differential Type AUTOMATED DIFFERENTIAL     Segs Relative 90.7 (H) 36 - 66 %    Lymphocytes % 4.0 (L) 24 - 44 %    Monocytes % 4.5 (H) 0 - 4 %    Eosinophils % 0.0 0 - 3 %    Basophils % 0.1 0 - 1 %    Segs Absolute 14.9 K/CU MM    Lymphocytes Absolute 0.7 K/CU MM    Monocytes Absolute 0.7 K/CU MM    Eosinophils Absolute 0.0 K/CU MM    Basophils Absolute 0.0 K/CU MM    Nucleated RBC % 0.0 %    Total Nucleated RBC 0.0 K/CU MM    Total Immature Neutrophil 0.11 K/CU MM    Immature Neutrophil % 0.7 (H) 0 - 0.43 %   APTT   Result Value Ref Range    aPTT 34.4 25.1 - 37.1 SECONDS   Protime-INR   Result Value Ref Range    Protime 19.5 (H) 11.7 - 14.5 SECONDS    INR 1.51 INDEX   EKG 12 Lead   Result Value Ref Range    Ventricular Rate 82 BPM    Atrial Rate 82 BPM    P-R Interval 188 ms    QRS Duration 132 ms    Q-T Interval 410 ms    QTc Calculation (Bazett) 479 ms    P Axis 65 degrees    R Axis 31 degrees    T Axis 40 degrees    Diagnosis       Normal sinus rhythm  Nonspecific intraventricular block  T wave abnormality, consider anterior ischemia  Abnormal ECG  When compared with ECG of 03-MAY-2021 15:08,  Previous ECG has undetermined rhythm, needs review  QRS duration has increased  Nonspecific T wave abnormality now evident in Inferior leads  T wave inversion more evident in Anterior leads             EKG    Normal sinus rhythm at 82 bpm with a nonspecific intraventricular block. See emergency department physician's note for final interpretation. RADIOLOGY    CT CHEST W CONTRAST   Preliminary Result   1. Airspace opacities involving the right upper lobe and bilateral lower   lobes. Additionally, fluid-filled distention of the esophagus is present. Overall, findings may represent multifocal pneumonia versus aspiration given   esophageal findings. 2. Partial visualization of fluid-filled and distended stomach and small   bowel loops. Please refer to pending dedicated CT abdomen and pelvis for   further details. 3. Partial visualization of a cervical spine fracture. Please refer to   dedicated report for further details. CT CERVICAL SPINE WO CONTRAST   Final Result   Addendum 1 of 1   ADDENDUM:   Critical results were called by Dr. Lola Stephen to Stanley Mayorgaman on    2/16/2022   at 10:25. Final   Unstable distracted fracture through the C4-C5 disc space with left-sided   jumped facet, nondisplaced fractures of the spinous process of C4, left   superior articular process of C5 and left transverse processes of C5, C6. Recommend CTA of the neck. CT HEAD WO CONTRAST   Final Result   No acute intracranial abnormality.       RECOMMENDATIONS:   Unavailable         CT LUMBAR SPINE WO CONTRAST    (Results Pending)   CT ABDOMEN PELVIS W IV CONTRAST Additional Contrast? None    (Results Pending)   CTA HEAD NECK W CONTRAST    (Results Pending)   CT THORACIC RECONSTRUCTION WO POST PROCESS    (Results Pending)             ED COURSE & MEDICAL DECISION MAKING     79-year-old male presents emergency department via EMS for altered mental status and inability to move all extremities status post a fall. The patient does take Eliquis. C-collar in place on arrival.  Trauma alert was initiated on arrival.  Lab work, EKG,  and imaging obtained. He was medicated with morphine and Zofran. Radiology called and states the patient has an unstable C4-C5 fracture. Other imaging is pending at this time. [facility] was consulted for transfer. Dr. Romulo Ji states he will accept the patient. The patient is being transferred via helicopter. Risks benefits discussed with family. Vital signs and nursing notes reviewed during ED course. I have personally provided 35 minutes of critical care time exclusive of time spent on separately billable procedures. Time includes review of laboratory data, radiology results, discussion with consultants, and monitoring for potential decompensation. Interventions were performed as documented above. Clinical  IMPRESSION    1. Other closed displaced fracture of fourth cervical vertebra, initial encounter ([facility] [address]    2. Other closed displaced fracture of fifth cervical vertebra, initial encounter ([facility] [address]    3. Altered mental status, unspecified altered mental status type    4. Fall, initial encounter              Comment: Please note this report has been produced using speech recognition software and may contain errors related to that system including errors in grammar, punctuation, and spelling, as well as words and phrases that may be inappropriate. If there are any questions or concerns please feel free to contact the dictating provider for clarification.       Romana Harari, APRN - CNP  02/17/22 [id number]
